# Patient Record
Sex: MALE | Race: WHITE | Employment: FULL TIME | URBAN - METROPOLITAN AREA
[De-identification: names, ages, dates, MRNs, and addresses within clinical notes are randomized per-mention and may not be internally consistent; named-entity substitution may affect disease eponyms.]

---

## 2022-11-14 ENCOUNTER — HOSPITAL ENCOUNTER (INPATIENT)
Age: 42
LOS: 7 days | Discharge: HOME | DRG: 885 | End: 2022-11-21
Attending: PSYCHIATRY & NEUROLOGY | Admitting: PSYCHIATRY & NEUROLOGY
Payer: MEDICARE

## 2022-11-14 ENCOUNTER — HOSPITAL ENCOUNTER (EMERGENCY)
Age: 42
Discharge: ANOTHER HOSPITAL | End: 2022-11-14
Attending: EMERGENCY MEDICINE
Payer: MEDICARE

## 2022-11-14 VITALS
OXYGEN SATURATION: 100 % | HEART RATE: 75 BPM | TEMPERATURE: 97.5 F | SYSTOLIC BLOOD PRESSURE: 136 MMHG | DIASTOLIC BLOOD PRESSURE: 87 MMHG | HEIGHT: 71 IN | BODY MASS INDEX: 23.27 KG/M2 | RESPIRATION RATE: 18 BRPM | WEIGHT: 166.23 LBS

## 2022-11-14 DIAGNOSIS — F31.4 BIPOLAR DISORDER, CURRENT EPISODE DEPRESSED, SEVERE, WITHOUT PSYCHOTIC FEATURES (HCC): Primary | ICD-10-CM

## 2022-11-14 LAB
ALBUMIN SERPL-MCNC: 3.7 G/DL (ref 3.5–5)
ALBUMIN/GLOB SERPL: 1.2 {RATIO} (ref 1.1–2.2)
ALP SERPL-CCNC: 84 U/L (ref 45–117)
ALT SERPL-CCNC: 16 U/L (ref 12–78)
AMPHET UR QL SCN: NEGATIVE
ANION GAP SERPL CALC-SCNC: 6 MMOL/L (ref 5–15)
APAP SERPL-MCNC: <2 UG/ML (ref 10–30)
APPEARANCE UR: CLEAR
AST SERPL-CCNC: 5 U/L (ref 15–37)
BACTERIA URNS QL MICRO: NEGATIVE /HPF
BARBITURATES UR QL SCN: NEGATIVE
BASOPHILS # BLD: 0.1 K/UL (ref 0–0.1)
BASOPHILS NFR BLD: 1 % (ref 0–1)
BENZODIAZ UR QL: NEGATIVE
BILIRUB SERPL-MCNC: 0.4 MG/DL (ref 0.2–1)
BILIRUB UR QL: NEGATIVE
BUN SERPL-MCNC: 12 MG/DL (ref 6–20)
BUN/CREAT SERPL: 10 (ref 12–20)
CALCIUM SERPL-MCNC: 8.9 MG/DL (ref 8.5–10.1)
CANNABINOIDS UR QL SCN: NEGATIVE
CHLORIDE SERPL-SCNC: 105 MMOL/L (ref 97–108)
CO2 SERPL-SCNC: 28 MMOL/L (ref 21–32)
COCAINE UR QL SCN: NEGATIVE
COLOR UR: NORMAL
COVID-19 RAPID TEST, COVR: NOT DETECTED
CREAT SERPL-MCNC: 1.19 MG/DL (ref 0.7–1.3)
DATE LAST DOSE: ABNORMAL
DIFFERENTIAL METHOD BLD: NORMAL
DRUG SCRN COMMENT,DRGCM: NORMAL
EOSINOPHIL # BLD: 0.1 K/UL (ref 0–0.4)
EOSINOPHIL NFR BLD: 1 % (ref 0–7)
EPITH CASTS URNS QL MICRO: NORMAL /LPF
ERYTHROCYTE [DISTWIDTH] IN BLOOD BY AUTOMATED COUNT: 12.2 % (ref 11.5–14.5)
ETHANOL SERPL-MCNC: <10 MG/DL
FLUAV RNA SPEC QL NAA+PROBE: NOT DETECTED
FLUBV RNA SPEC QL NAA+PROBE: NOT DETECTED
GLOBULIN SER CALC-MCNC: 3.1 G/DL (ref 2–4)
GLUCOSE SERPL-MCNC: 172 MG/DL (ref 65–100)
GLUCOSE UR STRIP.AUTO-MCNC: NEGATIVE MG/DL
HCT VFR BLD AUTO: 43.1 % (ref 36.6–50.3)
HGB BLD-MCNC: 14.4 G/DL (ref 12.1–17)
HGB UR QL STRIP: NEGATIVE
HYALINE CASTS URNS QL MICRO: NORMAL /LPF (ref 0–2)
IMM GRANULOCYTES # BLD AUTO: 0 K/UL (ref 0–0.04)
IMM GRANULOCYTES NFR BLD AUTO: 0 % (ref 0–0.5)
KETONES UR QL STRIP.AUTO: NEGATIVE MG/DL
LEUKOCYTE ESTERASE UR QL STRIP.AUTO: NEGATIVE
LITHIUM SERPL-SCNC: <0.2 MMOL/L (ref 0.6–1.2)
LYMPHOCYTES # BLD: 1.5 K/UL (ref 0.8–3.5)
LYMPHOCYTES NFR BLD: 17 % (ref 12–49)
MCH RBC QN AUTO: 31.6 PG (ref 26–34)
MCHC RBC AUTO-ENTMCNC: 33.4 G/DL (ref 30–36.5)
MCV RBC AUTO: 94.5 FL (ref 80–99)
METHADONE UR QL: NEGATIVE
MONOCYTES # BLD: 0.5 K/UL (ref 0–1)
MONOCYTES NFR BLD: 6 % (ref 5–13)
NEUTS SEG # BLD: 6.6 K/UL (ref 1.8–8)
NEUTS SEG NFR BLD: 75 % (ref 32–75)
NITRITE UR QL STRIP.AUTO: NEGATIVE
NRBC # BLD: 0 K/UL (ref 0–0.01)
NRBC BLD-RTO: 0 PER 100 WBC
OPIATES UR QL: NEGATIVE
PCP UR QL: NEGATIVE
PH UR STRIP: 6.5 [PH] (ref 5–8)
PLATELET # BLD AUTO: 233 K/UL (ref 150–400)
PMV BLD AUTO: 10.5 FL (ref 8.9–12.9)
POTASSIUM SERPL-SCNC: 3.6 MMOL/L (ref 3.5–5.1)
PROT SERPL-MCNC: 6.8 G/DL (ref 6.4–8.2)
PROT UR STRIP-MCNC: NEGATIVE MG/DL
RBC # BLD AUTO: 4.56 M/UL (ref 4.1–5.7)
RBC #/AREA URNS HPF: NORMAL /HPF (ref 0–5)
REPORTED DOSE,DOSE: ABNORMAL UNITS
REPORTED DOSE/TIME,TMG: ABNORMAL
SALICYLATES SERPL-MCNC: <1.7 MG/DL (ref 2.8–20)
SARS-COV-2, COV2: NOT DETECTED
SODIUM SERPL-SCNC: 139 MMOL/L (ref 136–145)
SOURCE, COVRS: NORMAL
SP GR UR REFRACTOMETRY: <1.005
UA: UC IF INDICATED,UAUC: NORMAL
UROBILINOGEN UR QL STRIP.AUTO: 0.2 EU/DL (ref 0.2–1)
WBC # BLD AUTO: 8.7 K/UL (ref 4.1–11.1)
WBC URNS QL MICRO: NORMAL /HPF (ref 0–4)

## 2022-11-14 PROCEDURE — 80179 DRUG ASSAY SALICYLATE: CPT

## 2022-11-14 PROCEDURE — 65220000003 HC RM SEMIPRIVATE PSYCH

## 2022-11-14 PROCEDURE — 80053 COMPREHEN METABOLIC PANEL: CPT

## 2022-11-14 PROCEDURE — 87635 SARS-COV-2 COVID-19 AMP PRB: CPT

## 2022-11-14 PROCEDURE — 80143 DRUG ASSAY ACETAMINOPHEN: CPT

## 2022-11-14 PROCEDURE — 74011250637 HC RX REV CODE- 250/637: Performed by: EMERGENCY MEDICINE

## 2022-11-14 PROCEDURE — 99285 EMERGENCY DEPT VISIT HI MDM: CPT

## 2022-11-14 PROCEDURE — 81001 URINALYSIS AUTO W/SCOPE: CPT

## 2022-11-14 PROCEDURE — 87636 SARSCOV2 & INF A&B AMP PRB: CPT

## 2022-11-14 PROCEDURE — 85025 COMPLETE CBC W/AUTO DIFF WBC: CPT

## 2022-11-14 PROCEDURE — 36415 COLL VENOUS BLD VENIPUNCTURE: CPT

## 2022-11-14 PROCEDURE — 80178 ASSAY OF LITHIUM: CPT

## 2022-11-14 PROCEDURE — 80307 DRUG TEST PRSMV CHEM ANLYZR: CPT

## 2022-11-14 PROCEDURE — 82077 ASSAY SPEC XCP UR&BREATH IA: CPT

## 2022-11-14 RX ORDER — ARIPIPRAZOLE 5 MG/1
20 TABLET ORAL DAILY
Status: DISCONTINUED | OUTPATIENT
Start: 2022-11-15 | End: 2022-11-14

## 2022-11-14 RX ORDER — LITHIUM CARBONATE 300 MG
300 TABLET ORAL 2 TIMES DAILY
Status: DISCONTINUED | OUTPATIENT
Start: 2022-11-14 | End: 2022-11-14 | Stop reason: HOSPADM

## 2022-11-14 RX ORDER — IBUPROFEN 200 MG
1 TABLET ORAL DAILY
Status: DISCONTINUED | OUTPATIENT
Start: 2022-11-14 | End: 2022-11-14 | Stop reason: HOSPADM

## 2022-11-14 RX ORDER — CLONAZEPAM 0.5 MG/1
0.5 TABLET ORAL
Status: COMPLETED | OUTPATIENT
Start: 2022-11-14 | End: 2022-11-14

## 2022-11-14 RX ORDER — IBUPROFEN 200 MG
1 TABLET ORAL DAILY
Status: DISCONTINUED | OUTPATIENT
Start: 2022-11-15 | End: 2022-11-14

## 2022-11-14 RX ORDER — ARIPIPRAZOLE 5 MG/1
20 TABLET ORAL DAILY
Status: DISCONTINUED | OUTPATIENT
Start: 2022-11-14 | End: 2022-11-14 | Stop reason: HOSPADM

## 2022-11-14 RX ADMIN — ARIPIPRAZOLE 20 MG: 5 TABLET ORAL at 19:14

## 2022-11-14 RX ADMIN — CLONAZEPAM 0.5 MG: 0.5 TABLET ORAL at 16:50

## 2022-11-14 RX ADMIN — LITHIUM CARBONATE 300 MG: 300 TABLET ORAL at 19:14

## 2022-11-14 NOTE — PROGRESS NOTES
BSMART assessment completed, and suicide risk level noted to be moderate. Primary Nurse/John and ED Provider/Dr Alvaro Santos notified. Concerns not observed. Security/Off- has not been notified.

## 2022-11-14 NOTE — BSMART NOTE
Lindat spoke to ED provider regarding patient. There are two previous consults to be completed before patient will be assessed. Provider is agreeable with plan of care.
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
## 2022-11-15 PROBLEM — F31.30 BIPOLAR DISORDER CURRENT EPISODE DEPRESSED (HCC): Status: ACTIVE | Noted: 2022-11-15

## 2022-11-15 PROCEDURE — 74011250637 HC RX REV CODE- 250/637

## 2022-11-15 PROCEDURE — 65220000003 HC RM SEMIPRIVATE PSYCH

## 2022-11-15 RX ORDER — ARIPIPRAZOLE 10 MG/1
10 TABLET ORAL DAILY
Status: DISCONTINUED | OUTPATIENT
Start: 2022-11-15 | End: 2022-11-16

## 2022-11-15 RX ORDER — ACETAMINOPHEN 325 MG/1
650 TABLET ORAL
Status: DISCONTINUED | OUTPATIENT
Start: 2022-11-15 | End: 2022-11-21 | Stop reason: HOSPADM

## 2022-11-15 RX ORDER — IBUPROFEN 200 MG
1 TABLET ORAL DAILY
Status: DISCONTINUED | OUTPATIENT
Start: 2022-11-15 | End: 2022-11-21 | Stop reason: HOSPADM

## 2022-11-15 RX ORDER — MIDAZOLAM HYDROCHLORIDE 1 MG/ML
2 INJECTION, SOLUTION INTRAMUSCULAR; INTRAVENOUS
Status: DISCONTINUED | OUTPATIENT
Start: 2022-11-15 | End: 2022-11-21 | Stop reason: HOSPADM

## 2022-11-15 RX ORDER — HALOPERIDOL 5 MG/ML
5 INJECTION INTRAMUSCULAR
Status: DISCONTINUED | OUTPATIENT
Start: 2022-11-15 | End: 2022-11-21 | Stop reason: HOSPADM

## 2022-11-15 RX ORDER — DIPHENHYDRAMINE HYDROCHLORIDE 50 MG/ML
50 INJECTION, SOLUTION INTRAMUSCULAR; INTRAVENOUS
Status: DISCONTINUED | OUTPATIENT
Start: 2022-11-15 | End: 2022-11-21 | Stop reason: HOSPADM

## 2022-11-15 RX ORDER — ARIPIPRAZOLE 20 MG/1
20 TABLET ORAL DAILY
Status: ON HOLD | COMMUNITY
End: 2022-11-21 | Stop reason: SDUPTHER

## 2022-11-15 RX ORDER — BENZTROPINE MESYLATE 1 MG/1
1 TABLET ORAL
Status: DISCONTINUED | OUTPATIENT
Start: 2022-11-15 | End: 2022-11-21 | Stop reason: HOSPADM

## 2022-11-15 RX ORDER — ADHESIVE BANDAGE
30 BANDAGE TOPICAL DAILY PRN
Status: DISCONTINUED | OUTPATIENT
Start: 2022-11-15 | End: 2022-11-21 | Stop reason: HOSPADM

## 2022-11-15 RX ORDER — TRAZODONE HYDROCHLORIDE 50 MG/1
50 TABLET ORAL
Status: DISCONTINUED | OUTPATIENT
Start: 2022-11-15 | End: 2022-11-21 | Stop reason: HOSPADM

## 2022-11-15 RX ORDER — LITHIUM CARBONATE 300 MG
300 TABLET ORAL 2 TIMES DAILY
Status: DISCONTINUED | OUTPATIENT
Start: 2022-11-15 | End: 2022-11-21 | Stop reason: HOSPADM

## 2022-11-15 RX ORDER — OLANZAPINE 5 MG/1
5 TABLET ORAL
Status: DISCONTINUED | OUTPATIENT
Start: 2022-11-15 | End: 2022-11-21 | Stop reason: HOSPADM

## 2022-11-15 RX ORDER — HYDROXYZINE 50 MG/1
50 TABLET, FILM COATED ORAL
Status: DISCONTINUED | OUTPATIENT
Start: 2022-11-15 | End: 2022-11-21 | Stop reason: HOSPADM

## 2022-11-15 RX ADMIN — ACETAMINOPHEN 650 MG: 325 TABLET ORAL at 17:09

## 2022-11-15 RX ADMIN — LITHIUM CARBONATE 300 MG: 300 TABLET ORAL at 22:19

## 2022-11-15 RX ADMIN — LITHIUM CARBONATE 300 MG: 300 TABLET ORAL at 11:04

## 2022-11-15 RX ADMIN — ARIPIPRAZOLE 10 MG: 10 TABLET ORAL at 11:04

## 2022-11-15 RX ADMIN — ACETAMINOPHEN 650 MG: 325 TABLET ORAL at 08:48
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
## 2022-11-15 NOTE — BH NOTES
GROUP THERAPY PROGRESS NOTE    Patient did not participate in recreational therapy group.     BENY Bianchi, QMHP-A
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
## 2022-11-15 NOTE — PROGRESS NOTES
Laboratory Monitoring for Antipsychotics: This patient is currently prescribed the following medication(s):   Current Facility-Administered Medications   Medication Dose Route Frequency    [START ON 11/17/2022] ARIPiprazole (ABILIFY) tablet 20 mg  20 mg Oral DAILY    lithium carbonate tablet 300 mg  300 mg Oral BID    nicotine (NICODERM CQ) 21 mg/24 hr patch 1 Patch  1 Patch TransDERmal DAILY     The following labs have been completed for monitoring of antipsychotics and/or mood stabilizers:    Height, Weight, BMI Estimation  Estimated body mass index is 23.06 kg/m² as calculated from the following:    Height as of this encounter: 180.3 cm (71\"). Weight as of this encounter: 75 kg (165 lb 5.5 oz). Vital Signs/Blood Pressure  Visit Vitals  /75 (BP Patient Position: Sitting)   Pulse (!) 105   Temp 97.6 °F (36.4 °C)   Resp 16   Ht 180.3 cm (71\")   Wt 75 kg (165 lb 5.5 oz)   SpO2 99%   BMI 23.06 kg/m²     Renal Function, Hepatic Function and Chemistry  Estimated Creatinine Clearance: 84 mL/min (based on SCr of 1.19 mg/dL). Lab Results   Component Value Date/Time    Sodium 139 11/14/2022 03:42 PM    Potassium 3.6 11/14/2022 03:42 PM    Chloride 105 11/14/2022 03:42 PM    CO2 28 11/14/2022 03:42 PM    Anion gap 6 11/14/2022 03:42 PM    BUN 12 11/14/2022 03:42 PM    Creatinine 1.19 11/14/2022 03:42 PM    BUN/Creatinine ratio 10 (L) 11/14/2022 03:42 PM    Bilirubin, total 0.4 11/14/2022 03:42 PM    Protein, total 6.8 11/14/2022 03:42 PM    Albumin 3.7 11/14/2022 03:42 PM    Globulin 3.1 11/14/2022 03:42 PM    A-G Ratio 1.2 11/14/2022 03:42 PM    ALT (SGPT) 16 11/14/2022 03:42 PM    AST (SGOT) 5 (L) 11/14/2022 03:42 PM    Alk.  phosphatase 84 11/14/2022 03:42 PM     Lab Results   Component Value Date/Time    Glucose 172 (H) 11/14/2022 03:42 PM     No results found for: HBA1C, NYL8ONIR    Hematology  Lab Results   Component Value Date/Time    WBC 8.7 11/14/2022 03:42 PM    RBC 4.56 11/14/2022 03:42 PM    HGB 14.4 11/14/2022 03:42 PM    HCT 43.1 11/14/2022 03:42 PM    MCV 94.5 11/14/2022 03:42 PM    MCH 31.6 11/14/2022 03:42 PM    MCHC 33.4 11/14/2022 03:42 PM    RDW 12.2 11/14/2022 03:42 PM    PLATELET 919 63/42/8751 03:42 PM     Lipids  Lab Results   Component Value Date/Time    Cholesterol, total 144 11/16/2022 06:00 AM    HDL Cholesterol 47 11/16/2022 06:00 AM    LDL, calculated 78.6 11/16/2022 06:00 AM    Triglyceride 92 11/16/2022 06:00 AM    CHOL/HDL Ratio 3.1 11/16/2022 06:00 AM     Thyroid Function  Lab Results   Component Value Date/Time    TSH 0.39 11/16/2022 06:00 AM    T4, Total 9.8 11/16/2022 06:00 AM     Assessment/Plan:  Recommended baseline laboratory monitoring has been completed based on this patient's current medication regimen. The hemoglobin A1c has been drawn but has not yet resulted. Follow-up metabolic monitoring labs should be completed in 3 months (quarterly for the first year of antipsychotic therapy).      ZHANG FranklinD

## 2022-11-15 NOTE — INTERDISCIPLINARY ROUNDS
Behavioral Health Interdisciplinary Rounds     Patient Name: Marisol Ott  Age: 38 y.o. Room/Bed:  746/02  Primary Diagnosis: <principal problem not specified>   Admission Status: Voluntary     Readmission within 30 days: no  Power of  in place: no  Patient requires a blocked bed: no          Reason for blocked bed:       Flu Vaccine : no   Consults:          Labs/Testing due today? Have they refused labs?: no    Sleep hours:  7      Participation in Care/Groups:  n/a new admit  Medication Compliant?:  No meds scheduled at this time  PRNS (last 24 hours): None    Restraints (last 24 hours):  no     CIWA (range last 24 hours):     COWS (range last 24 hours):      Alcohol screening (AUDIT) completed -         If applicable, date SBIRT discussed in treatment team AND documented:   AUDIT Screen Score:        Document Brief Intervention (corresponds directly with the 5 A's, Ask, Advise, Assess, Assist, and Arrange): At- Risk Patients (Score 7-15 for women; 8-15 for men)  Discuss concern patient is drinking at unhealthy levels known to increase risk of alcohol-related health problems. Is Patient ready to commit to change? If No:  Encourage reflection  Discuss short term and long term health risks of consuming alcohol  Barriers to change  Reaffirm willingness to help / Educational materials provided  If Yes:  Set goal  Plan  Educational materials provided    Harmful use or Dependence (Score 16 or greater)  Discuss short term and long term health risks of consuming alcohol  Recommendations  Negotiate drinking goal  Recommend addiction specialist/center  Arrange follow-up appointments.     Tobacco - patient is a smoker: Have You Used Tobacco in the Past 30 Days: Yes  Illegal Drugs use: Have You Used Any Illegal Substances Over the Past 12 Months: No    24 hour chart check complete: yes ____________________________________________________________________________________________________________    Patient goal(s) for today:   Treatment team focus/goals:   Progress note     LOS:  1  Expected LOS:     Financial concerns/prescription coverage:    Family contact:       Family requesting physician contact today:    Discharge plan:   Access to weapons :         Outpatient provider(s):   Patient's preferred phone number for follow up call :   Patient's preferred e-mail address :  Participating treatment team members: Amy Alicea, * (assigned SW),

## 2022-11-15 NOTE — PROGRESS NOTES
Problem: Falls - Risk of  Goal: *Absence of Falls  Description: Document Shade Bivins Fall Risk and appropriate interventions in the flowsheet.   Outcome: Progressing Towards Goal  Note: Fall Risk Interventions:

## 2022-11-15 NOTE — BH NOTES
GROUP THERAPY PROGRESS NOTE    Patient did not participate in self-care group.     Jaxson SWAN, [facility]-A

## 2022-11-15 NOTE — BH NOTES
GROUP THERAPY PROGRESS NOTE    Patient did not participate in psychotherapy group.     Chucho Knox MSW, [facility]-A

## 2022-11-15 NOTE — ED NOTES
Patient sleeping comfortably while he waits for transport  EMTALA ready  Chart copied and ready  ADP and transfer forms ready
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
## 2022-11-15 NOTE — BH NOTES
Primary Nurse Mahala Kanner and Aldo Brunner, RN performed a dual skin assessment on this patient No impairment noted  Blanco score is 23
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
## 2022-11-15 NOTE — BH NOTES
TRANSFER - IN REPORT:    Verbal report received from LESLEY Gomez(name) on Tomas Juárez  being received from [facility] ED(unit) for routine progression of care      Report consisted of patients Situation, Background, Assessment and   Recommendations(SBAR). Information from the following report(s) SBAR, MAR, and Recent Results was reviewed with the receiving nurse. Opportunity for questions and clarification was provided. Assessment completed upon patients arrival to unit and care assumed.

## 2022-11-15 NOTE — BSMART NOTE
Patient has been accepted by [facility] on behalf of Dr Carl Ordoñez to [facility] 746 Bed 2. Report can be called at [phone].
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
## 2022-11-15 NOTE — INTERDISCIPLINARY ROUNDS
Behavioral Health Interdisciplinary Rounds     Patient Name: Patricio Raygoza  Age: 38 y.o. Room/Bed:  746/02  Primary Diagnosis: <principal problem not specified>   Admission Status: Voluntary     Readmission within 30 days: no  Power of  in place: no  Patient requires a blocked bed: no          Reason for blocked bed:       Flu Vaccine : no   Consults:          Labs/Testing due today? Have they refused labs?: no    Sleep hours:  7      Participation in Care/Groups:  n/a new admit  Medication Compliant?:  No meds scheduled at this time  PRNS (last 24 hours): None    Restraints (last 24 hours):  no     CIWA (range last 24 hours):     COWS (range last 24 hours):      Alcohol screening (AUDIT) completed -         If applicable, date SBIRT discussed in treatment team AND documented:   AUDIT Screen Score:        Document Brief Intervention (corresponds directly with the 5 A's, Ask, Advise, Assess, Assist, and Arrange): At- Risk Patients (Score 7-15 for women; 8-15 for men)  Discuss concern patient is drinking at unhealthy levels known to increase risk of alcohol-related health problems. Is Patient ready to commit to change? If No:  Encourage reflection  Discuss short term and long term health risks of consuming alcohol  Barriers to change  Reaffirm willingness to help / Educational materials provided  If Yes:  Set goal  Plan  Educational materials provided    Harmful use or Dependence (Score 16 or greater)  Discuss short term and long term health risks of consuming alcohol  Recommendations  Negotiate drinking goal  Recommend addiction specialist/center  Arrange follow-up appointments.     Tobacco - patient is a smoker: Have You Used Tobacco in the Past 30 Days: Yes  Illegal Drugs use: Have You Used Any Illegal Substances Over the Past 12 Months: No    24 hour chart check complete: yes ____________________________________________________________________________________________________________    Patient goal(s) for today: rest, communicate needs to staff, complete ADLs  Treatment team focus/goals: discuss reason for admission  Progress note: Pt met with treatment team for the first time and discussed reason for admission. Recent stressors include separation from his wife and unstable housing. Presenting problems include lack of hygiene, weight loss, depressive symptoms, and anxiety. NP discussed psychiatric care hx and beginning medications on the unit. The SW asked about social supports to begin community assessment and discharge planning. LOS:  1  Expected LOS: 3-5    Financial concerns/prescription coverage: [place] Medicare Part A & B  Family contact: Shira Macias, [phone]  Family requesting physician contact today: no  Discharge plan:   Access to weapons:   Outpatient provider(s): [facility]  Patient's preferred phone number for follow up call: [phone]   Patient's preferred e-mail address:   Participating treatment team members: Kelsie Walden; Graciela Lopez NP; Molina Askew RN; BENY Mitchell; ANGELO Watts

## 2022-11-15 NOTE — PROGRESS NOTES
Problem: Depressed Mood (Adult/Pediatric)  Goal: *STG: Participates in treatment plan  Outcome: Progressing Towards Goal  Note: Out on unit passively engaged, mood depressed/anxious and affect flat. Declined to attend group this am. Reports lack of medications and ability to afford Donald Locus as primary reason for his admission and decompensation. Denies SI at this time. Thoughts organized, logical and goal directed. Open to restarting medications  and adjustments. Staff focus is on offering support and reassurance.    Goal: *STG: Verbalizes anger, guilt, and other feelings in a constructive manor  Outcome: Progressing Towards Goal  Goal: *STG: Demonstrates reduction in symptoms and increase in insight into coping skills/future focused  Outcome: Progressing Towards Goal  Goal: Interventions  Outcome: Progressing Towards Goal

## 2022-11-16 LAB
CHOLEST SERPL-MCNC: 144 MG/DL
COMMENT, HOLDF: NORMAL
HDLC SERPL-MCNC: 47 MG/DL
HDLC SERPL: 3.1 {RATIO} (ref 0–5)
LDLC SERPL CALC-MCNC: 78.6 MG/DL (ref 0–100)
SAMPLES BEING HELD,HOLD: NORMAL
T3FREE SERPL-MCNC: 2.3 PG/ML (ref 2.2–4)
T4 SERPL-MCNC: 9.8 UG/DL (ref 4.5–12.1)
TRIGL SERPL-MCNC: 92 MG/DL (ref ?–150)
TSH SERPL DL<=0.05 MIU/L-ACNC: 0.39 UIU/ML (ref 0.36–3.74)
VLDLC SERPL CALC-MCNC: 18.4 MG/DL

## 2022-11-16 PROCEDURE — 74011250637 HC RX REV CODE- 250/637

## 2022-11-16 PROCEDURE — 84481 FREE ASSAY (FT-3): CPT

## 2022-11-16 PROCEDURE — 84443 ASSAY THYROID STIM HORMONE: CPT

## 2022-11-16 PROCEDURE — 65220000003 HC RM SEMIPRIVATE PSYCH

## 2022-11-16 PROCEDURE — 80061 LIPID PANEL: CPT

## 2022-11-16 PROCEDURE — 36415 COLL VENOUS BLD VENIPUNCTURE: CPT

## 2022-11-16 PROCEDURE — 84436 ASSAY OF TOTAL THYROXINE: CPT

## 2022-11-16 RX ORDER — ARIPIPRAZOLE 10 MG/1
20 TABLET ORAL DAILY
Status: DISCONTINUED | OUTPATIENT
Start: 2022-11-17 | End: 2022-11-21 | Stop reason: HOSPADM

## 2022-11-16 RX ADMIN — LITHIUM CARBONATE 300 MG: 300 TABLET ORAL at 09:07

## 2022-11-16 RX ADMIN — ACETAMINOPHEN 650 MG: 325 TABLET ORAL at 09:07

## 2022-11-16 RX ADMIN — LITHIUM CARBONATE 300 MG: 300 TABLET ORAL at 22:01

## 2022-11-16 RX ADMIN — ACETAMINOPHEN 650 MG: 325 TABLET ORAL at 16:54

## 2022-11-16 RX ADMIN — ARIPIPRAZOLE 10 MG: 10 TABLET ORAL at 09:07

## 2022-11-16 NOTE — INTERDISCIPLINARY ROUNDS
Behavioral Health Interdisciplinary Rounds     Patient Name: Yovany Wan  Age: 38 y.o. Room/Bed:  729/  Primary Diagnosis: <principal problem not specified>   Admission Status: Voluntary     Readmission within 30 days: no  Power of  in place: no  Patient requires a blocked bed: no          Reason for blocked bed:       Flu Vaccine :    Consults:          Labs/Testing due today? Have they refused labs?: yes    Sleep hours: 7       Participation in Care/Groups:    Medication Compliant?: Yes  PRNS (last 24 hours): None    Restraints (last 24 hours):  no     CIWA (range last 24 hours):     COWS (range last 24 hours):      Alcohol screening (AUDIT) completed -         If applicable, date SBIRT discussed in treatment team AND documented:   AUDIT Screen Score:        Document Brief Intervention (corresponds directly with the 5 A's, Ask, Advise, Assess, Assist, and Arrange): At- Risk Patients (Score 7-15 for women; 8-15 for men)  Discuss concern patient is drinking at unhealthy levels known to increase risk of alcohol-related health problems. Is Patient ready to commit to change? If No:  Encourage reflection  Discuss short term and long term health risks of consuming alcohol  Barriers to change  Reaffirm willingness to help / Educational materials provided  If Yes:  Set goal  Plan  Educational materials provided    Harmful use or Dependence (Score 16 or greater)  Discuss short term and long term health risks of consuming alcohol  Recommendations  Negotiate drinking goal  Recommend addiction specialist/center  Arrange follow-up appointments.     Tobacco - patient is a smoker: Have You Used Tobacco in the Past 30 Days: Yes  Illegal Drugs use: Have You Used Any Illegal Substances Over the Past 12 Months: No    24 hour chart check complete: yes   ____________________________________________________________________________________________________________    Patient goal(s) for today: rest, communicate needs to staff, take medication as prescribed, complete ADLs  Treatment team focus/goals: discuss treatment plan and medication regiment   Progress note: Treatment team discussed medication regiment with pt and discussed any concerns pt had. PT expressed no other concerns. SW to follow up, NP to continue to monitor medications.                                    LOS:  2                  Expected LOS: 3-5     Financial concerns/prescription coverage: [place] Medicare Part A & B  Family contact: Laura Ac, [phone]  Family requesting physician contact today: no  Discharge plan:   Access to weapons:   Outpatient provider(s): [facility]  Patient's preferred phone number for follow up call: [phone]   Patient's preferred e-mail address:   Participating treatment team members: Tomas Juárez; Norris Fitch NP; Ruby Alexandra RN; BENY Gaviria; ALEYDA Valdivia.S.

## 2022-11-16 NOTE — BH NOTES
GROUP THERAPY PROGRESS NOTE    Patient did not participate in psychotherapy group.     Annetta SWAN, [facility]

## 2022-11-16 NOTE — PROGRESS NOTES
Spiritual Care Assessment/Progress Note  [facility]      NAME: Irvin Gomez      MRN: [id number]  AGE: 38 y.o.  SEX: male  Presybeterian Affiliation: No Hoahaoism   Language: English     11/16/2022     Total Time (in minutes): 35     Spiritual Assessment begun in [address] through conversation with:         [x]Patient        [] Family    [] Friend(s)        Reason for Consult: Initial/Spiritual assessment, patient floor     Spiritual beliefs: (Please include comment if needed)     [x] Identifies with a ubaldo tradition: Advent/Jehovah's witness        [] Supported by a ubaldo community:            [] Claims no spiritual orientation:           [] Seeking spiritual identity:                [] Adheres to an individual form of spirituality:           [] Not able to assess:                           Identified resources for coping:      [] Prayer                               [] Music                  [] Guided Imagery     [] Family/friends                 [] Pet visits     [] Devotional reading                         [x] Unknown     [] Other:                                               Interventions offered during this visit: (See comments for more details)    Patient Interventions: Initial visit, Coping skills reviewed/reinforced, Affirmation of ubaldo, Affirmation of emotions/emotional suffering, Catharsis/review of pertinent events in supportive environment           Plan of Care:     [] Support spiritual and/or cultural needs    [] Support AMD and/or advance care planning process      [] Support grieving process   [] Coordinate Rites and/or Rituals    [] Coordination with community clergy   [] No spiritual needs identified at this time   [] Detailed Plan of Care below (See Comments)  [] Make referral to Music Therapy  [] Make referral to Pet Therapy     [] Make referral to Addiction services  [] Make referral to [facility]  [] Make referral to Spiritual Care Partner  [] No future visits requested [] Contact Spiritual Care for further referrals     Comments:  Eileen Wallis at [address] in [address]. I reviewed the medical record prior to this encounter. Spiritual Assessment: Experiencing difficult emotions and relational stress    We discussed Marco Antonio's current feelings/concerns and spiritual perspectives. The patient reported that he's  from his wife. He shared about the events that lead to this admission and marital issues. He reports an addiction to stock trading and gambling. He stated that he was receiving spiritual care at the [facility] and is hoping to return to there for \"rehab\". I cultivated a relationship of support through listening; explored spiritual beliefs; emotional concerns; and coping strategies. I provided spiritual guidance in accordance with patient's expressed beliefs. I invited Andie Solitario to focus on his inner healing instead of marriage restoration. Outcome: Eileen Wallis expressed appreciation for pastoral support and the opportunity to share his thoughts and feelings. Plan of Care: Patient is aware of  availability and was encouraged to request support as needed. The  on-call can be reached at [phone]). Rev.  Arielle Lugo MDiv, MS, [facility]  Staff

## 2022-11-16 NOTE — PROGRESS NOTES
Problem: Depressed Mood (Adult/Pediatric)  Goal: *STG: Participates in treatment plan  Outcome: Progressing Towards Goal  Goal: *STG: Verbalizes anger, guilt, and other feelings in a constructive manor  Outcome: Progressing Towards Goal  Goal: *STG: Attends activities and groups  Outcome: Progressing Towards Goal     Pt alert and oriented. Pt is seen out on unit appropriate with peers and staff. Pt is meal and medication compliant.

## 2022-11-16 NOTE — PROGRESS NOTES
Chief Complaint: \"I feel pretty well. \"    Length of Stay: 2 Days    Interval History: Shameka Ji reports he is a little bored and there is not much to do on the unit. He has not been attending groups thus far. Discussed importance of attending group while here. He reports he slept pretty well overall last night other than being woken up by staff for various things. He states he is eating well. He is medication compliant with no side effects that he has noticed other than a little hand tremor, but he states he always has some of this. He states his mood is better than is was yesterday. He admits it is not where it should be currently. He denies SI/HI/AVH currently. Past Medical History:  Past Medical History:   Diagnosis Date    Psychiatric disorder     phobias           Labs:  Lab Results   Component Value Date/Time    WBC 8.7 11/14/2022 03:42 PM    HGB 14.4 11/14/2022 03:42 PM    HCT 43.1 11/14/2022 03:42 PM    PLATELET 130 23/66/9166 03:42 PM    MCV 94.5 11/14/2022 03:42 PM      Lab Results   Component Value Date/Time    Sodium 139 11/14/2022 03:42 PM    Potassium 3.6 11/14/2022 03:42 PM    Chloride 105 11/14/2022 03:42 PM    CO2 28 11/14/2022 03:42 PM    Anion gap 6 11/14/2022 03:42 PM    Glucose 172 (H) 11/14/2022 03:42 PM    BUN 12 11/14/2022 03:42 PM    Creatinine 1.19 11/14/2022 03:42 PM    BUN/Creatinine ratio 10 (L) 11/14/2022 03:42 PM    Calcium 8.9 11/14/2022 03:42 PM    Bilirubin, total 0.4 11/14/2022 03:42 PM    Alk.  phosphatase 84 11/14/2022 03:42 PM    Protein, total 6.8 11/14/2022 03:42 PM    Albumin 3.7 11/14/2022 03:42 PM    Globulin 3.1 11/14/2022 03:42 PM    A-G Ratio 1.2 11/14/2022 03:42 PM    ALT (SGPT) 16 11/14/2022 03:42 PM      Vitals:    11/15/22 1100 11/15/22 1607 11/15/22 2025 11/16/22 0927   BP: 113/71 113/72 110/75 110/75   Pulse: 70 (!) 110 85 (!) 105   Resp: 16 16 16 16   Temp: 98.2 °F (36.8 °C) 98 °F (36.7 °C) 97.8 °F (36.6 °C) 97.6 °F (36.4 °C)   SpO2:  98% 99% 99%   Weight:       Height:             Current Facility-Administered Medications   Medication Dose Route Frequency Provider Last Rate Last Admin    OLANZapine (ZyPREXA) tablet 5 mg  5 mg Oral Q6H PRN Vanessa Booker, BRISEYDA        haloperidol lactate (HALDOL) injection 5 mg  5 mg IntraMUSCular Q6H PRN Vanessa Booker, BRISEYDA        benztropine (COGENTIN) tablet 1 mg  1 mg Oral BID PRN Vanessa De La Pazson, NP        diphenhydrAMINE (BENADRYL) injection 50 mg  50 mg IntraMUSCular BID PRN Vanessa Booker, NP        hydrOXYzine HCL (ATARAX) tablet 50 mg  50 mg Oral TID PRN Vanessa Jhony, NP        traZODone (DESYREL) tablet 50 mg  50 mg Oral QHS PRN Vanessarosa Booker, NP        acetaminophen (TYLENOL) tablet 650 mg  650 mg Oral Q4H PRN Vanessa Boligee, NP   650 mg at 11/16/22 8867    magnesium hydroxide (MILK OF MAGNESIA) 400 mg/5 mL oral suspension 30 mL  30 mL Oral DAILY PRN Vanessa Booker, NP        midazolam (VERSED) injection 2 mg  2 mg IntraMUSCular Q6H PRN Vanessa Booker, BRISEYDA        lithium carbonate tablet 300 mg  300 mg Oral BID Fritz Lanes A, NP   300 mg at 11/16/22 6130    nicotine (NICODERM CQ) 21 mg/24 hr patch 1 Patch  1 Patch TransDERmal DAILY Nelson Fabiola, NP   1 Patch at 11/16/22 0695    ARIPiprazole (ABILIFY) tablet 10 mg  10 mg Oral DAILY Nelson Lorimor, NP   10 mg at 11/16/22 0907         Mental Status Exam:  Eye contact:   Grooming: fair  Psychomotor activity:   Speech is spontaneous  Mood is \" \"  Affect:  Perception:  Suicidal ideation:   Cognition is grossly intact         Physical Exam:  Body habitus: Body mass index is 23.06 kg/m². Musculoskeletal system: normal gait  Tremor - neg  Cog wheeling - neg      Assessment and Plan:  Ney Stauffer meets criteria for a diagnosis of bipolar disorder, current episode depressed. INCREASE Abilify to 20 mg daily  Continue the medication regimen as prescribed  Disposition planning to continue.    A coordinated, multidisplinary treatment team round was conducted with the patient, nurses, pharmcist,  and writer present. Discussions held with , and/or with family members; Complete current electronic health record for patient was reviewed in full including consultant notes, ancillary staff notes, nurses and tech notes, labs and vitals. I certify that this patients inpatient psychiatric hospital services furnished since the previous certification were, and continue to be, required for treatment that could reasonably be expected to improve the patient's condition, or for diagnostic study, and that the patient continues to need, on a daily basis, active treatment furnished directly by or requiring the supervision of inpatient psychiatric facility personnel. In addition, the hospital records show that services furnished were intensive treatment services, admission or related services, or equivalent services. 1 Principal Discharge DX:	Headache

## 2022-11-16 NOTE — PROGRESS NOTES
Problem: Depressed Mood (Adult/Pediatric)  Goal: *STG: Participates in treatment plan  Outcome: Progressing Towards Goal  Goal: *STG: Verbalizes anger, guilt, and other feelings in a constructive manor  Outcome: Progressing Towards Goal  Goal: *STG: Attends activities and groups  Outcome: Progressing Towards Goal  Goal: *STG: Demonstrates reduction in symptoms and increase in insight into coping skills/future focused  Outcome: Progressing Towards Goal    0715: received patient resting quietly in bed no

## 2022-11-16 NOTE — H&P
[address] [facility]  History and Physical    Date of Service:  11/15/2022  Primary Care Provider: None  Source of information: The patient and Chart review    Chief Complaint: No chief complaint on file. History of Presenting Illness:   Virginie Boudreaux is a 38 y.o. male with a PMH of bipolar who presented to [facility] ED after being off his medications for two weeks and felt \"out of touch with reality\". He typically takes abilify and lithium for his bipolar. He states he has dipped for about 25 years and is wearing a nicotine patch. Denies drug or alcohol use. He states he received two Pzifer covid vaccinations. Hospitalist team evaluated patient for H&P. Throughout assessment he was calm and cooperative. The patient denies any headache, blurry vision, sore throat, trouble swallowing, trouble with speech, chest pain, SOB, cough, fever, chills, N/V/D, abd pain, urinary symptoms, constipation. The only complaint he had was some sciatica pain which he stretches to improve. REVIEW OF SYSTEMS:  A comprehensive review of systems was negative except for that written in the History of Present Illness. Past Medical History:   Diagnosis Date    Psychiatric disorder     phobias      No past surgical history on file. Prior to Admission medications    Medication Sig Start Date End Date Taking? Authorizing Provider   ARIPiprazole (ABILIFY) 20 mg tablet Take 20 mg by mouth daily. Provider, Historical   lithium carbonate 300 mg capsule Take 300 mg by mouth two (2) times daily (with meals). Other, MD Javier     Allergies   Allergen Reactions    Tetracycline Unknown (comments)     Clancy strange      Family History   Problem Relation Age of Onset    No Known Problems Mother     No Known Problems Father       Social History:  reports that he has never smoked. He does not have any smokeless tobacco history on file. He reports current alcohol use.  He reports that he does not use drugs. Family and social history were personally reviewed, all pertinent and relevant details are outlined as above. Objective:   Visit Vitals  /75 (BP 1 Location: Left upper arm, BP Patient Position: Sitting)   Pulse 85   Temp 97.8 °F (36.6 °C)   Resp 16   Ht 5' 11\" (1.803 m)   Wt 75 kg (165 lb 5.5 oz)   SpO2 99%   BMI 23.06 kg/m²           PHYSICAL EXAM:   General: Alert x oriented x 3, awake, no acute distress, pleasant male, appears to be stated age  [de-identified]: PEERL, moist mucus membranes  Neck: Supple, no JVD  Chest: Clear to auscultation bilaterally   CVS: RRR, S1 S2 heard, no murmurs/rubs/gallops  Abd: Soft, non-tender, non-distended, +bowel sounds   Ext: No clubbing, no cyanosis, no edema  Neuro/Psych: Pleasant mood and affect, CN 2-12 grossly intact, sensory grossly within normal limit, Strength 5/5 in all extremities, DTR 1+ x 4  Cap refill: Brisk, less than 3 seconds  Pulses: 2+, symmetric in all extremities  Skin: Warm, dry, without rashes or lesions    Data Review: All diagnostic labs and studies have been reviewed. Abnormal Labs Reviewed - No data to display    All Micro Results       None            IMAGING:   No orders to display        ECG/ECHO:  No results found for this or any previous visit. Assessment:   Given the patient's current clinical presentation, there is a high level of concern for decompensation if discharged from the emergency department. Complex decision making was performed, which includes reviewing the patient's available past medical records, laboratory results, and imaging studies.     Active Problems:    Bipolar disorder current episode depressed ([facility] [address] (11/15/2022)      Plan:     Tobacco use disorder:  -nicotine patch ordered  -counseled on cessation    Sciatica:  -Tylenol and stretching    Bipolar:  -managed by primary team/psychiatry  -lithium and abilify ordered    Thank you for allowing us to participate in this patient's care, please reconsult for further support, signing off. Signed By: Melody Kennedy NP     November 15, 2022         Please note that this dictation may have been completed with Dinorah Diaz, the computer voice recognition software. Quite often unanticipated grammatical, syntax, homophones, and other interpretive errors are inadvertently transcribed by the computer software. Please disregard these errors. Please excuse any errors that have escaped final proofreading.

## 2022-11-16 NOTE — PROGRESS NOTES
Problem: Falls - Risk of  Goal: *Absence of Falls  Description: Document Wallace Quezada Fall Risk and appropriate interventions in the flowsheet. Outcome: Progressing Towards Goal  Note: Fall Risk Interventions:            Medication Interventions: Teach patient to arise slowly    Resting in bed with eyes closed, no complaints, no distress noted. Safety measures in place, will continue to monitor.

## 2022-11-16 NOTE — BH NOTES
GROUP THERAPY PROGRESS NOTE    Patient did not participate in recreational therapy group.     BENY Hair, HP-A

## 2022-11-16 NOTE — INTERDISCIPLINARY ROUNDS
Behavioral Health Interdisciplinary Rounds     Patient Name: Patricio Raygoza  Age: 38 y.o. Room/Bed:  729/  Primary Diagnosis: <principal problem not specified>   Admission Status: Voluntary     Readmission within 30 days: no  Power of  in place: no  Patient requires a blocked bed: no          Reason for blocked bed:       Flu Vaccine :    Consults:          Labs/Testing due today? Have they refused labs?: yes    Sleep hours: 7       Participation in Care/Groups:    Medication Compliant?: Yes  PRNS (last 24 hours): None    Restraints (last 24 hours):  no     CIWA (range last 24 hours):     COWS (range last 24 hours):      Alcohol screening (AUDIT) completed -         If applicable, date SBIRT discussed in treatment team AND documented:   AUDIT Screen Score:        Document Brief Intervention (corresponds directly with the 5 A's, Ask, Advise, Assess, Assist, and Arrange): At- Risk Patients (Score 7-15 for women; 8-15 for men)  Discuss concern patient is drinking at unhealthy levels known to increase risk of alcohol-related health problems. Is Patient ready to commit to change? If No:  Encourage reflection  Discuss short term and long term health risks of consuming alcohol  Barriers to change  Reaffirm willingness to help / Educational materials provided  If Yes:  Set goal  Plan  Educational materials provided    Harmful use or Dependence (Score 16 or greater)  Discuss short term and long term health risks of consuming alcohol  Recommendations  Negotiate drinking goal  Recommend addiction specialist/center  Arrange follow-up appointments.     Tobacco - patient is a smoker: Have You Used Tobacco in the Past 30 Days: Yes  Illegal Drugs use: Have You Used Any Illegal Substances Over the Past 12 Months: No    24 hour chart check complete: yes   ____________________________________________________________________________________________________________    Patient goal(s) for today:   Treatment team focus/goals:   Progress note                                  LOS:  2  Expected LOS:     Financial concerns/prescription coverage:    Family contact:       Family requesting physician contact today:    Discharge plan:   Access to weapons :         Outpatient provider(s):   Patient's preferred phone number for follow up call :   Patient's preferred e-mail address :  Participating treatment team members: Nael Alicea, * (assigned SW),

## 2022-11-16 NOTE — BH NOTES
GROUP THERAPY PROGRESS NOTE    Patient did not participate in psychotherapy group.     Iva Jones MSW, QMHP-A

## 2022-11-16 NOTE — PROGRESS NOTES
Problem: Depressed Mood (Adult/Pediatric)  Goal: *STG: Participates in treatment plan  Outcome: Progressing Towards Goal     Problem: Depressed Mood (Adult/Pediatric)  Goal: *STG: Verbalizes anger, guilt, and other feelings in a constructive manor  Outcome: Progressing Towards Goal

## 2022-11-17 PROCEDURE — 74011250637 HC RX REV CODE- 250/637

## 2022-11-17 PROCEDURE — 65220000003 HC RM SEMIPRIVATE PSYCH

## 2022-11-17 RX ADMIN — ACETAMINOPHEN 650 MG: 325 TABLET ORAL at 12:10

## 2022-11-17 RX ADMIN — ACETAMINOPHEN 650 MG: 325 TABLET ORAL at 16:39

## 2022-11-17 RX ADMIN — ACETAMINOPHEN 650 MG: 325 TABLET ORAL at 20:51

## 2022-11-17 RX ADMIN — ARIPIPRAZOLE 20 MG: 10 TABLET ORAL at 09:51

## 2022-11-17 RX ADMIN — LITHIUM CARBONATE 300 MG: 300 TABLET ORAL at 09:51

## 2022-11-17 RX ADMIN — LITHIUM CARBONATE 300 MG: 300 TABLET ORAL at 20:50

## 2022-11-17 NOTE — PROGRESS NOTES
Problem: Depressed Mood (Adult/Pediatric)  Goal: *STG: Participates in treatment plan  Outcome: Progressing Towards Goal  Note: Out on unit for meals, passively engaged, declined to attend group. Denies SI, no self harming behaviors. States medications working and he feels his thoughts are more organzied, denies SI, states his negative thoughts are less intense.    Goal: *STG: Demonstrates reduction in symptoms and increase in insight into coping skills/future focused  Outcome: Progressing Towards Goal  Goal: Interventions  Outcome: Progressing Towards Goal

## 2022-11-17 NOTE — INTERDISCIPLINARY ROUNDS
Behavioral Health Interdisciplinary Rounds     Patient Name: Tomas Juárez  Age: 38 y.o. Room/Bed:  729/  Primary Diagnosis: <principal problem not specified>   Admission Status: Voluntary     Readmission within 30 days: no  Power of  in place: no  Patient requires a blocked bed: no           Sleep hours:       Participation in Care/Groups:    Medication Compliant?: Yes  PRNS (last 24 hours): None    Restraints (last 24 hours):  no     Alcohol screening (AUDIT) completed -     If applicable, date SBIRT discussed in treatment team AND documented:    Tobacco - patient is a smoker: Have You Used Tobacco in the Past 30 Days: Yes  Illegal Drugs use: Have You Used Any Illegal Substances Over the Past 12 Months: No    24 hour chart check complete: yes     _______________________________________________    Patient goal(s) for today: Communicate needs to staff  Treatment team focus/goals: assess pt, manage medications, discharge planning   Progress note: Pt reports having trouble sleeping and sciatic pain. Pt denies SI. Pts medications, adjusted.  SW will help establish follow up    Financial concerns/prescription coverage:    Family contact:                        Family requesting physician contact today:    Discharge plan: TBD   Access to weapons No :                                                              Outpatient provider(s): Medicare   Patient's preferred phone number for follow up call :   Patient's preferred e-mail address :  Participating treatment team members:    LOS:  3 Expected LOS: 11/21/11    Participating treatment team members: Tomas Marquita, BENY Rodriguez, Melodie Beard, Intern, Em Hudson RN, Norris Fitch, NP

## 2022-11-17 NOTE — PROGRESS NOTES
Problem: Falls - Risk of  Goal: *Absence of Falls  Description: Document Dennie Oak Fall Risk and appropriate interventions in the flowsheet. Outcome: Progressing Towards Goal  Note: Fall Risk Interventions:            Medication Interventions: Teach patient to arise slowly    Elimination Interventions: Toilet paper/wipes in reach       At present:patient resting with eyes closed ,resp even regular and unlabored. No distress noted. Q 15 min checks are ongoing for safety.

## 2022-11-17 NOTE — PROGRESS NOTES
MUSIC THERAPY GROUP PROGRESS NOTE        11/17/2022    The patient Ney souza 38 y.o. male participated in Music Therapy group on [address] [facility]. Group time: 11:00-11:45am     Personal goal for participation: Patient (pt) will actively participate in at least one-third of the group time. Goal orientation: Socio-emotional     Group therapy participation: Active     Therapeutic interventions: Group drumming, Empathic listening     Observation of participation: The patient actively participated in the entirety of the Music Therapy Group time for which he was present. He entered about group home through the group time. Pt selected which rhythm instruments he wanted to play, increased self-expression while using his instrument, and appeared to actively listen to other group members. In response to group members being asked to think of a feeling they've had in the past few days and play what it sounds like to them on their instrument, then share (if they choose) what it was, pt played this on his instrument and shared he'd felt sadness. MT provided supportive presence, empathic listening, and invited the group to play her rhythm again with her to provide validation through music.      Andrzej Fischer MT-BC (Music Therapist-Board Certified)  Spiritual Care Department  Referral-based service

## 2022-11-17 NOTE — PROGRESS NOTES
Chief Complaint: \"I am feeling okay. \"    Length of Stay: 3 Days    Interval History: Miladys Early reports he is having back sciatic pain. He has had 8 injections in the past and this is not a new issue for him. He went to the music group today. He states he has been trying to think about the future and he thinks staying with his family in [place] will be a good idea. He reports he had a little trouble sleeping last night due to noise in the hallway. He denies SI/HI/AVH currently. Appetite is good. He states he is not getting enough to eat currently. He reports mood is about the same today. He denies SI/HI/AVH currently. Past Medical History:  Past Medical History:   Diagnosis Date    Psychiatric disorder     phobias           Labs:  Lab Results   Component Value Date/Time    WBC 8.7 11/14/2022 03:42 PM    HGB 14.4 11/14/2022 03:42 PM    HCT 43.1 11/14/2022 03:42 PM    PLATELET 589 77/09/8628 03:42 PM    MCV 94.5 11/14/2022 03:42 PM      Lab Results   Component Value Date/Time    Sodium 139 11/14/2022 03:42 PM    Potassium 3.6 11/14/2022 03:42 PM    Chloride 105 11/14/2022 03:42 PM    CO2 28 11/14/2022 03:42 PM    Anion gap 6 11/14/2022 03:42 PM    Glucose 172 (H) 11/14/2022 03:42 PM    BUN 12 11/14/2022 03:42 PM    Creatinine 1.19 11/14/2022 03:42 PM    BUN/Creatinine ratio 10 (L) 11/14/2022 03:42 PM    Calcium 8.9 11/14/2022 03:42 PM    Bilirubin, total 0.4 11/14/2022 03:42 PM    Alk.  phosphatase 84 11/14/2022 03:42 PM    Protein, total 6.8 11/14/2022 03:42 PM    Albumin 3.7 11/14/2022 03:42 PM    Globulin 3.1 11/14/2022 03:42 PM    A-G Ratio 1.2 11/14/2022 03:42 PM    ALT (SGPT) 16 11/14/2022 03:42 PM      Vitals:    11/15/22 2025 11/16/22 0927 11/16/22 1752 11/17/22 0741   BP: 110/75 110/75 114/78 109/74   Pulse: 85 (!) 105 (!) 111 77   Resp: 16 16 16 16   Temp: 97.8 °F (36.6 °C) 97.6 °F (36.4 °C) 97.5 °F (36.4 °C) 97.6 °F (36.4 °C)   SpO2: 99% 99% 99% 100%   Weight:       Height: Current Facility-Administered Medications   Medication Dose Route Frequency Provider Last Rate Last Admin    ARIPiprazole (ABILIFY) tablet 20 mg  20 mg Oral DAILY Ozella Arias A, NP   20 mg at 11/17/22 0951    OLANZapine (ZyPREXA) tablet 5 mg  5 mg Oral Q6H PRN Kerry Mann, NP        haloperidol lactate (HALDOL) injection 5 mg  5 mg IntraMUSCular Q6H PRN [facility] Johnathan, NP        benztropine (COGENTIN) tablet 1 mg  1 mg Oral BID PRN [facility], NP        diphenhydrAMINE (BENADRYL) injection 50 mg  50 mg IntraMUSCular BID PRN [facility], NP        hydrOXYzine HCL (ATARAX) tablet 50 mg  50 mg Oral TID [facility]N [facility], [facility]        traZODone (DESYREL) tablet 50 mg  50 mg Oral QHS PRN [facility], NP        acetaminophen (TYLENOL) tablet 650 mg  650 mg Oral Q4H PRN [facility], NP   650 mg at 11/16/22 1654    magnesium hydroxide (MILK OF MAGNESIA) 400 mg/5 mL oral suspension 30 mL  30 mL Oral DAILY PRN [facility], NP        midazolam (VERSED) injection 2 mg  2 mg IntraMUSCular Q6H PRN [facility] John[facility], BRISEYDA        lithium carbonate tablet 300 mg  300 mg Oral BID Ozella Arias A, NP   300 mg at 11/17/22 0951    nicotine (NICODERM CQ) 21 mg/24 hr patch 1 Patch  1 Patch TransDERmal DAILY [facility] BRISEYDA   1 Patch at 11/17/22 7179         Mental Status Exam:  Eye contact: good  Grooming: fair  Psychomotor activity: WNL  Speech is spontaneous  Mood is \"okay \"  Affect: depressed  Perception: Denies AVH  Suicidal ideation: Denies SI  Cognition is grossly intact         Physical Exam:  Body habitus: Body mass index is 23.06 kg/m². Musculoskeletal system: normal gait  Tremor - neg  Cog wheeling - neg      Assessment and Plan:  Marisol Ott meets criteria for a diagnosis of bipolar disorder, current episode depressed. Continue the medication regimen as prescribed  Disposition planning to continue.    A coordinated, multidisplinary treatment team round was conducted with the patient, nurses, pharmcist,  and writer present. Discussions held with , and/or with family members; Complete current electronic health record for patient was reviewed in full including consultant notes, ancillary staff notes, nurses and tech notes, labs and vitals. I certify that this patients inpatient psychiatric hospital services furnished since the previous certification were, and continue to be, required for treatment that could reasonably be expected to improve the patient's condition, or for diagnostic study, and that the patient continues to need, on a daily basis, active treatment furnished directly by or requiring the supervision of inpatient psychiatric facility personnel. In addition, the hospital records show that services furnished were intensive treatment services, admission or related services, or equivalent services.

## 2022-11-17 NOTE — BH NOTES
GROUP THERAPY PROGRESS NOTE    Patient did not participate in self-care group.     Rufus Sams MSW, HP-A

## 2022-11-17 NOTE — BH NOTES
GROUP THERAPY PROGRESS NOTE    Patient did not participate in Psychotherapy group.     BENY Saxena, [facility]-A

## 2022-11-18 PROCEDURE — 74011250637 HC RX REV CODE- 250/637

## 2022-11-18 PROCEDURE — 65220000003 HC RM SEMIPRIVATE PSYCH

## 2022-11-18 RX ORDER — BACLOFEN 10 MG/1
5 TABLET ORAL 3 TIMES DAILY
Status: DISCONTINUED | OUTPATIENT
Start: 2022-11-18 | End: 2022-11-21 | Stop reason: HOSPADM

## 2022-11-18 RX ADMIN — LITHIUM CARBONATE 300 MG: 300 TABLET ORAL at 21:28

## 2022-11-18 RX ADMIN — ARIPIPRAZOLE 20 MG: 10 TABLET ORAL at 08:37

## 2022-11-18 RX ADMIN — ACETAMINOPHEN 650 MG: 325 TABLET ORAL at 08:37

## 2022-11-18 RX ADMIN — BACLOFEN 5 MG: 10 TABLET ORAL at 15:23

## 2022-11-18 RX ADMIN — LITHIUM CARBONATE 300 MG: 300 TABLET ORAL at 08:37

## 2022-11-18 RX ADMIN — BACLOFEN 5 MG: 10 TABLET ORAL at 21:28

## 2022-11-18 NOTE — PROGRESS NOTES
Chief Complaint: \"I am feeling okay. \"    Length of Stay: 4 Days    Interval History: Alyssa Monroe reports he is still having back sciatic pain. Discussed options. He reports mood is a little better today. He states he is being hard on himself about how he got here though. He is sleeping and eating well. He is medication compliant and has no side effects to report currently. He denies SI/HI/AVH currently. Past Medical History:  Past Medical History:   Diagnosis Date    Psychiatric disorder     phobias           Labs:  Lab Results   Component Value Date/Time    WBC 8.7 11/14/2022 03:42 PM    HGB 14.4 11/14/2022 03:42 PM    HCT 43.1 11/14/2022 03:42 PM    PLATELET 652 97/08/5490 03:42 PM    MCV 94.5 11/14/2022 03:42 PM      Lab Results   Component Value Date/Time    Sodium 139 11/14/2022 03:42 PM    Potassium 3.6 11/14/2022 03:42 PM    Chloride 105 11/14/2022 03:42 PM    CO2 28 11/14/2022 03:42 PM    Anion gap 6 11/14/2022 03:42 PM    Glucose 172 (H) 11/14/2022 03:42 PM    BUN 12 11/14/2022 03:42 PM    Creatinine 1.19 11/14/2022 03:42 PM    BUN/Creatinine ratio 10 (L) 11/14/2022 03:42 PM    Calcium 8.9 11/14/2022 03:42 PM    Bilirubin, total 0.4 11/14/2022 03:42 PM    Alk.  phosphatase 84 11/14/2022 03:42 PM    Protein, total 6.8 11/14/2022 03:42 PM    Albumin 3.7 11/14/2022 03:42 PM    Globulin 3.1 11/14/2022 03:42 PM    A-G Ratio 1.2 11/14/2022 03:42 PM    ALT (SGPT) 16 11/14/2022 03:42 PM      Vitals:    11/16/22 1752 11/17/22 0741 11/17/22 1552 11/18/22 0753   BP: 114/78 109/74 101/67 117/78   Pulse: (!) 111 77 91 (!) 109   Resp: 16 16 15 16   Temp: 97.5 °F (36.4 °C) 97.6 °F (36.4 °C) 98.2 °F (36.8 °C) 97.8 °F (36.6 °C)   SpO2: 99% 100% 99% 100%   Weight:       Height:             Current Facility-Administered Medications   Medication Dose Route Frequency Provider Last Rate Last Admin    ARIPiprazole (ABILIFY) tablet 20 mg  20 mg Oral DAILY Johnny CHACKO NP   20 mg at 11/18/22 0837    OLANZapine (ZyPREXA) tablet 5 mg  5 mg Oral Q6H PRN Elverna Lav, NP        haloperidol lactate (HALDOL) injection 5 mg  5 mg IntraMUSCular Q6H PRN Elverna Lav, NP        benztropine (COGENTIN) tablet 1 mg  1 mg Oral BID PRN Elverna Lav, NP        diphenhydrAMINE (BENADRYL) injection 50 mg  50 mg IntraMUSCular BID PRN Elverna Lav, NP        hydrOXYzine HCL (ATARAX) tablet 50 mg  50 mg Oral TID PRN Elverna Lav, NP        traZODone (DESYREL) tablet 50 mg  50 mg Oral QHS PRN Elverna Lav, NP        acetaminophen (TYLENOL) tablet 650 mg  650 mg Oral Q4H PRN Elverna Lav, NP   650 mg at 11/18/22 0222    magnesium hydroxide (MILK OF MAGNESIA) 400 mg/5 mL oral suspension 30 mL  30 mL Oral DAILY PRN Elverna Lav, NP        midazolam (VERSED) injection 2 mg  2 mg IntraMUSCular Q6H PRN Elverna Lav, NP        lithium carbonate tablet 300 mg  300 mg Oral BID Amber Guess A, NP   300 mg at 11/18/22 6201    nicotine (NICODERM CQ) 21 mg/24 hr patch 1 Patch  1 Patch TransDERmal DAILY Marianna Mock, NP   1 Patch at 11/18/22 4997         Mental Status Exam:  Eye contact: good  Grooming: fair  Psychomotor activity: WNL  Speech is spontaneous  Mood is \"okay \"  Affect: depressed  Perception: Denies AVH  Suicidal ideation: Denies SI  Cognition is grossly intact         Physical Exam:  Body habitus: Body mass index is 23.06 kg/m². Musculoskeletal system: normal gait  Tremor - neg  Cog wheeling - neg      Assessment and Plan:  Sunshine Martin meets criteria for a diagnosis of bipolar disorder, current episode depressed. Continue the medication regimen as prescribed  Disposition planning to continue. A coordinated, multidisplinary treatment team round was conducted with the patient, nurses, pharmcist,  and writer present. Discussions held with , and/or with family members;  Complete current electronic health record for patient was reviewed in full including consultant notes, ancillary staff notes, nurses and tech notes, labs and vitals. I certify that this patients inpatient psychiatric hospital services furnished since the previous certification were, and continue to be, required for treatment that could reasonably be expected to improve the patient's condition, or for diagnostic study, and that the patient continues to need, on a daily basis, active treatment furnished directly by or requiring the supervision of inpatient psychiatric facility personnel. In addition, the hospital records show that services furnished were intensive treatment services, admission or related services, or equivalent services.

## 2022-11-18 NOTE — PROGRESS NOTES
Problem: Falls - Risk of  Goal: *Absence of Falls  Description: Document Fierropaul Mackay Fall Risk and appropriate interventions in the flowsheet. Outcome: Progressing Towards Goal  Note: Fall Risk Interventions:    Medication Interventions: Teach patient to arise slowly    Elimination Interventions: Toilet paper/wipes in reach    Patient is resting quietly in bed with eyes closed. Appears to be asleep. No acute or respiratory distress noted. Will continue to monitor q15 min rounds.

## 2022-11-18 NOTE — INTERDISCIPLINARY ROUNDS
Behavioral Health Interdisciplinary Rounds     Patient Name: Carlena Goodpasture  Age: 38 y.o. Room/Bed:  [facility]/  Primary Diagnosis: <principal problem not specified>   Admission Status: Voluntary     Readmission within 30 days: no  Power of  in place: no  Patient requires a blocked bed: no          Reason for blocked bed:   Sleep hours:        Participation in Care/Groups:  no  Medication Compliant?: Yes  PRNS (last 24 hours): Pain    Restraints (last 24 hours):  no     Alcohol screening (AUDIT) completed -     If applicable, date SBIRT discussed in treatment team AND documented:    Tobacco - patient is a smoker: Have You Used Tobacco in the Past 30 Days: Yes  Illegal Drugs use: Have You Used Any Illegal Substances Over the Past 12 Months: No    24 hour chart check complete: yes     _______________________________________________    Patient goal(s) for today: Communicate needs to staff  Treatment team focus/goals: Assess pt, manage medications, discharge planning   Progress note: Pt is stable. Pt denies SI, HI, AVH. Pt still endorses sciatic pain. Medications adjusted. Pt is med compliant. Lithium level scheduled for Saturday AM. Pt has follow up scheduled. Financial concerns/prescription coverage:    Family contact:                        Family requesting physician contact today:    Discharge plan: PT will return home after discharge   Access to weapons no :                                                               Outpatient provider(s): [facility]   Patient's preferred phone number for follow up call :   Patient's preferred e-mail address :  Participating treatment team members:    LOS:  4 Expected LOS: 11/21/22    Participating treatment team members: Carlena Goodpasture, Neeta Calderon, RN, Eligio Cook, NP

## 2022-11-18 NOTE — DISCHARGE INSTRUCTIONS
DISCHARGE SUMMARY    NAME:Marco Antonio Alicea  : 1980  MRN: [id number]    The patient Katie Carver exhibits the ability to control behavior in a less restrictive environment. Patient's level of functioning is improving. No assaultive/destructive behavior has been observed for the past 24 hours. No suicidal/homicidal threat or behavior has been observed for the past 24 hours. There is no evidence of serious medication side effects. Patient has not been in physical or protective restraints for at least the past 24 hours. If weapons involved, how are they secured? none    Is patient aware of and in agreement with discharge plan? yes    Arrangements for medication:  Prescriptions sent to pt pharmacy     Copy of discharge instructions to provider?:  faxed to [address] [place] [address] Bella    Arrangements for transportation home:  Family will      Keep all follow up appointments as scheduled, continue to take prescribed medications per physician instructions. Mental health crisis number:  [phone] or your local mental health crisis line number at [place] [address] Emergency WARM LINE      [phone] ([phone])      M-F: 9am to 9pm      Sat & Sun: 27[address] [facility] suicide prevention lines:                             9-891-VQHAVVM ([phone]       [phone] ([phone])    Crisis Text Line:  Text HOME to [place] from Nurse    PATIENT INSTRUCTIONS:    What to do at Home:  Recommended activity: Activity as tolerated,         *  Please give a list of your current medications to your Primary Care Provider. *  Please update this list whenever your medications are discontinued, doses are      changed, or new medications (including over-the-counter products) are added. *  Please carry medication information at all times in case of emergency situations.     These are general instructions for a healthy lifestyle:    No smoking/ No tobacco products/ Avoid exposure to second hand smoke  Surgeon General's Warning:  Quitting smoking now greatly reduces serious risk to your health. Obesity, smoking, and sedentary lifestyle greatly increases your risk for illness    A healthy diet, regular physical exercise & weight monitoring are important for maintaining a healthy lifestyle    You may be retaining fluid if you have a history of heart failure or if you experience any of the following symptoms:  Weight gain of 3 pounds or more overnight or 5 pounds in a week, increased swelling in our hands or feet or shortness of breath while lying flat in bed. Please call your doctor as soon as you notice any of these symptoms; do not wait until your next office visit. The discharge information has been reviewed with the patient. The patient verbalized understanding. Discharge medications reviewed with the patient and appropriate educational materials and side effects teaching were provided.   ___________________________________________________________________________________________________________________________________

## 2022-11-19 LAB
DATE LAST DOSE: ABNORMAL
LITHIUM SERPL-SCNC: 0.44 MMOL/L (ref 0.6–1.2)
REPORTED DOSE,DOSE: ABNORMAL UNITS
REPORTED DOSE/TIME,TMG: ABNORMAL

## 2022-11-19 PROCEDURE — 80178 ASSAY OF LITHIUM: CPT

## 2022-11-19 PROCEDURE — 74011250637 HC RX REV CODE- 250/637

## 2022-11-19 PROCEDURE — 36415 COLL VENOUS BLD VENIPUNCTURE: CPT

## 2022-11-19 PROCEDURE — 65220000003 HC RM SEMIPRIVATE PSYCH

## 2022-11-19 RX ADMIN — BACLOFEN 5 MG: 10 TABLET ORAL at 08:57

## 2022-11-19 RX ADMIN — LITHIUM CARBONATE 300 MG: 300 TABLET ORAL at 21:21

## 2022-11-19 RX ADMIN — ARIPIPRAZOLE 20 MG: 10 TABLET ORAL at 08:57

## 2022-11-19 RX ADMIN — LITHIUM CARBONATE 300 MG: 300 TABLET ORAL at 08:57

## 2022-11-19 RX ADMIN — ACETAMINOPHEN 650 MG: 325 TABLET ORAL at 08:57

## 2022-11-19 RX ADMIN — BACLOFEN 5 MG: 10 TABLET ORAL at 21:21

## 2022-11-19 RX ADMIN — ACETAMINOPHEN 650 MG: 325 TABLET ORAL at 17:08

## 2022-11-19 RX ADMIN — BACLOFEN 5 MG: 10 TABLET ORAL at 17:08

## 2022-11-19 NOTE — PROGRESS NOTES
Problem: Falls - Risk of  Goal: *Absence of Falls  Description: Document Clearence Skates Fall Risk and appropriate interventions in the flowsheet. Outcome: Progressing Towards Goal  Note: Fall Risk Interventions:     Medication Interventions: Teach patient to arise slowly    Elimination Interventions: Toilet paper/wipes in reach    Problem: Depressed Mood (Adult/Pediatric)  Goal: *STG: Verbalizes anger, guilt, and other feelings in a constructive manor  Outcome: Progressing Towards Goal  Goal: *STG: Demonstrates reduction in symptoms and increase in insight into coping skills/future focused  Outcome: Progressing Towards Goal  Goal: Interventions  Outcome: Progressing Towards Goal     Pt isolative to room. Denies current SI/HI/AVH. Remains medication and meal compliant. Will continue to monitor q15 minutes for safety.

## 2022-11-19 NOTE — PROGRESS NOTES
Patient resting in bed with eyes closed. No complaints or distress noted. Safety measures in place. Will continue to monitor q15 minutes. Problem: Falls - Risk of  Goal: *Absence of Falls  Description: Document Wu Lees Fall Risk and appropriate interventions in the flowsheet. Outcome: Progressing Towards Goal  Note: Fall Risk Interventions:            Medication Interventions: Teach patient to arise slowly    Elimination Interventions:  Toilet paper/wipes in reach

## 2022-11-19 NOTE — PROGRESS NOTES
Chief Complaint: \"I am feeling good. \"    Length of Stay: 5 Days    Interval History: Patricio Raygoza states he is doing well. Feels his mood is improved. Denies SI/plan/intent, HI/plan/intent, denies AVH. Eating and sleeping well. Back pain has improved. Taking meds as prescribed, no side effects reported or observed. No other changes or new concerns at this time. Past Medical History:  Past Medical History:   Diagnosis Date    Psychiatric disorder     phobias       Labs:  Lab Results   Component Value Date/Time    WBC 8.7 11/14/2022 03:42 PM    HGB 14.4 11/14/2022 03:42 PM    HCT 43.1 11/14/2022 03:42 PM    PLATELET 033 27/64/4095 03:42 PM    MCV 94.5 11/14/2022 03:42 PM      Lab Results   Component Value Date/Time    Sodium 139 11/14/2022 03:42 PM    Potassium 3.6 11/14/2022 03:42 PM    Chloride 105 11/14/2022 03:42 PM    CO2 28 11/14/2022 03:42 PM    Anion gap 6 11/14/2022 03:42 PM    Glucose 172 (H) 11/14/2022 03:42 PM    BUN 12 11/14/2022 03:42 PM    Creatinine 1.19 11/14/2022 03:42 PM    BUN/Creatinine ratio 10 (L) 11/14/2022 03:42 PM    Calcium 8.9 11/14/2022 03:42 PM    Bilirubin, total 0.4 11/14/2022 03:42 PM    Alk.  phosphatase 84 11/14/2022 03:42 PM    Protein, total 6.8 11/14/2022 03:42 PM    Albumin 3.7 11/14/2022 03:42 PM    Globulin 3.1 11/14/2022 03:42 PM    A-G Ratio 1.2 11/14/2022 03:42 PM    ALT (SGPT) 16 11/14/2022 03:42 PM      Vitals:    11/17/22 1552 11/18/22 0753 11/18/22 1514 11/19/22 0856   BP: 101/67 117/78 119/79 113/75   Pulse: 91 (!) 109 (!) 108 (!) 101   Resp: 15 16 16 16   Temp: 98.2 °F (36.8 °C) 97.8 °F (36.6 °C) 97.9 °F (36.6 °C) 97.5 °F (36.4 °C)   SpO2: 99% 100% 100% 96%   Weight:       Height:             Current Facility-Administered Medications   Medication Dose Route Frequency Provider Last Rate Last Admin    baclofen (LIORESAL) tablet 5 mg  5 mg Oral TID Olivia CHACKO NP   5 mg at 11/19/22 0857    ARIPiprazole (ABILIFY) tablet 20 mg  20 mg Oral DAILY Inez Metzger NP 20 mg at 11/19/22 0857    OLANZapine (ZyPREXA) tablet 5 mg  5 mg Oral Q6H PRN [facility] Haylie, BRISEYDA        haloperidol lactate (HALDOL) injection 5 mg  5 mg IntraMUSCular Q6H PRN [place], NP        benztropine (COGENTIN) tablet 1 mg  1 [facility]        diphenhydrAMINE (BENADRYL) injection 50 mg  50 mg IntraMUSCular BID PRN [place], NP        hydrOXYzine HCL (ATARAX) tablet 50 mg  50 mg Oral TID PRN [facility], NP        traZODone (DESYREL) tablet 50 mg  50 mg Oral QHS PRN [facility], NP        acetaminophen (TYLENOL) tablet 650 mg  650 mg Oral Q4H PRN [facility], NP   650 mg at 11/19/22 0857    magnesium hydroxide (MILK OF MAGNESIA) 400 mg/5 mL oral suspension 30 mL  30 mL Oral DAILY PRN [facility], NP        midazolam (VERSED) injection 2 mg  2 mg IntraMUSCular Q6H PRN [facility], NP        lithium carbonate tablet 300 mg  300 mg Oral BID Joaquina Lojuan daniel CHACKO, NP   300 mg at 11/19/22 0857    nicotine (NICODERM CQ) 21 mg/24 hr patch 1 Patch  1 Patch TransDERmal DAILY Valerie Escobar NP   1 Patch at 11/19/22 0858       Mental Status Exam:  Eye contact: good  Grooming: fair  Psychomotor activity: WNL  Speech is spontaneous  Mood is \"okay \"  Affect: blunt  Perception: Denies AVH  Suicidal ideation: Denies SI  Cognition is grossly intact    Physical Exam:  Body habitus: Body mass index is 23.06 kg/m². Musculoskeletal system: normal gait  Tremor - neg  Cog wheeling - neg    Assessment and Plan:  Yovany Wan meets criteria for a diagnosis of bipolar disorder, current episode depressed. Continue the medication regimen as prescribed  Disposition planning to continue. A coordinated, multidisplinary treatment team round was conducted with the patient, nurses, pharmcist,  and writer present. Discussions held with , and/or with family members;  Complete current electronic health record for patient was reviewed in full including consultant notes, ancillary staff notes, nurses and tech notes, labs and vitals. I certify that this patients inpatient psychiatric hospital services furnished since the previous certification were, and continue to be, required for treatment that could reasonably be expected to improve the patient's condition, or for diagnostic study, and that the patient continues to need, on a daily basis, active treatment furnished directly by or requiring the supervision of inpatient psychiatric facility personnel. In addition, the hospital records show that services furnished were intensive treatment services, admission or related services, or equivalent services.

## 2022-11-19 NOTE — PROGRESS NOTES
Problem: Falls - Risk of  Goal: *Absence of Falls  Description: Document Mary Marking Fall Risk and appropriate interventions in the flowsheet. Outcome: Progressing Towards Goal  Note: Fall Risk Interventions:            Medication Interventions: Teach patient to arise slowly    Elimination Interventions: Toilet paper/wipes in reach              Problem: Patient Education: Go to Patient Education Activity  Goal: Patient/Family Education  Outcome: Progressing Towards Goal     Problem: Depressed Mood (Adult/Pediatric)  Goal: *STG: Participates in treatment plan  Outcome: Progressing Towards Goal  Note: Pt participated in treatment team. Out on the unit for small periods of time. Denies any A/V hallucinations. Denies any thoughts of self harm. Taking medications as scheduled.    Goal: Interventions  Outcome: Progressing Towards Goal     Problem: Patient Education: Go to Patient Education Activity  Goal: Patient/Family Education  Outcome: Progressing Towards Goal

## 2022-11-20 PROCEDURE — 74011250637 HC RX REV CODE- 250/637

## 2022-11-20 PROCEDURE — 65220000003 HC RM SEMIPRIVATE PSYCH

## 2022-11-20 RX ADMIN — ACETAMINOPHEN 650 MG: 325 TABLET ORAL at 21:18

## 2022-11-20 RX ADMIN — BACLOFEN 5 MG: 10 TABLET ORAL at 09:18

## 2022-11-20 RX ADMIN — LITHIUM CARBONATE 300 MG: 300 TABLET ORAL at 21:18

## 2022-11-20 RX ADMIN — ARIPIPRAZOLE 20 MG: 10 TABLET ORAL at 09:18

## 2022-11-20 RX ADMIN — BACLOFEN 5 MG: 10 TABLET ORAL at 21:18

## 2022-11-20 RX ADMIN — BACLOFEN 5 MG: 10 TABLET ORAL at 16:31

## 2022-11-20 RX ADMIN — LITHIUM CARBONATE 300 MG: 300 TABLET ORAL at 09:18

## 2022-11-20 NOTE — PROGRESS NOTES
Problem: Depressed Mood (Adult/Pediatric)  Goal: *STG: Participates in treatment plan  Outcome: Progressing Towards Goal  Goal: *STG: Verbalizes anger, guilt, and other feelings in a constructive manor  Outcome: Progressing Towards Goal  Goal: *STG: Demonstrates reduction in symptoms and increase in insight into coping skills/future focused  Outcome: Progressing Towards Goal   Pt alert and oriented. Pt calm/cooperative and meal/med compliant. Pt mostly isolative to room, but interacts approprietly with staff and peers.

## 2022-11-20 NOTE — PROGRESS NOTES
Problem: Falls - Risk of  Goal: *Absence of Falls  Description: Document Ace Regalado Fall Risk and appropriate interventions in the flowsheet. Outcome: Progressing Towards Goal  Note: Fall Risk Interventions:            Medication Interventions: Teach patient to arise slowly    Elimination Interventions: Toilet paper/wipes in reach    Resting in bed with eyes closed, no complaints, no distress noted. Safety measures in place, will continue to monitor.

## 2022-11-20 NOTE — PROGRESS NOTES
Chief Complaint: \"I am feeling good. \"    Length of Stay: 6 Days    Interval History: Valeriano Riggins states he is doing OK for the most part. He states he is bothered that his weight has increased, as he states he has never been this heavy before. Continues to feel his mood is improved. Denies SI/plan/intent, HI/plan/intent, denies AVH. Eating and sleeping well. Back pain remains improved. Taking meds as prescribed, no side effects reported or observed. No other changes or new concerns at this time. Lithium level 0.44 yesterday. Past Medical History:  Past Medical History:   Diagnosis Date    Psychiatric disorder     phobias       Labs:  Lab Results   Component Value Date/Time    WBC 8.7 11/14/2022 03:42 PM    HGB 14.4 11/14/2022 03:42 PM    HCT 43.1 11/14/2022 03:42 PM    PLATELET 392 90/93/3041 03:42 PM    MCV 94.5 11/14/2022 03:42 PM      Lab Results   Component Value Date/Time    Sodium 139 11/14/2022 03:42 PM    Potassium 3.6 11/14/2022 03:42 PM    Chloride 105 11/14/2022 03:42 PM    CO2 28 11/14/2022 03:42 PM    Anion gap 6 11/14/2022 03:42 PM    Glucose 172 (H) 11/14/2022 03:42 PM    BUN 12 11/14/2022 03:42 PM    Creatinine 1.19 11/14/2022 03:42 PM    BUN/Creatinine ratio 10 (L) 11/14/2022 03:42 PM    Calcium 8.9 11/14/2022 03:42 PM    Bilirubin, total 0.4 11/14/2022 03:42 PM    Alk.  phosphatase 84 11/14/2022 03:42 PM    Protein, total 6.8 11/14/2022 03:42 PM    Albumin 3.7 11/14/2022 03:42 PM    Globulin 3.1 11/14/2022 03:42 PM    A-G Ratio 1.2 11/14/2022 03:42 PM    ALT (SGPT) 16 11/14/2022 03:42 PM      Vitals:    11/18/22 1514 11/19/22 0856 11/19/22 1622 11/20/22 0827   BP: 119/79 113/75 103/72 116/62   Pulse: (!) 108 (!) 101 69 98   Resp: 16 16 16 16   Temp: 97.9 °F (36.6 °C) 97.5 °F (36.4 °C) 97.9 °F (36.6 °C) 97.1 °F (36.2 °C)   SpO2: 100% 96% 98% 98%   Weight:    72.2 kg (159 lb 3.2 oz)   Height:             Current Facility-Administered Medications   Medication Dose Route Frequency Provider Last Rate Last Admin    baclofen (LIORESAL) tablet 5 mg  5 mg Oral TID Ramirez Live, NP   5 mg at 11/20/22 4624    ARIPiprazole (ABILIFY) tablet 20 mg  20 mg Oral DAILY Linzie Elsie A, NP   20 mg at 11/20/22 0918    OLANZapine (ZyPREXA) tablet 5 mg  5 mg Oral Q6H PRN Cele Kobus, NP        haloperidol lactate (HALDOL) injection 5 mg  5 mg IntraMUSCular Q6H PRN Cele Kobus, NP        benztropine (COGENTIN) tablet 1 mg  1 mg Oral BID PRN Cele Kobus, NP        diphenhydrAMINE (BENADRYL) injection 50 mg  50 mg IntraMUSCular BID PRN Cele Kobus, NP        hydrOXYzine HCL (ATARAX) tablet 50 mg  50 mg Oral TID PRN Cele Kobus, NP        traZODone (DESYREL) tablet 50 mg  50 mg Oral QHS PRN Cele Kobus, NP        acetaminophen (TYLENOL) tablet 650 mg  650 mg Oral Q4H PRN Cele Kobus, NP   650 mg at 11/19/22 1708    magnesium hydroxide (MILK OF MAGNESIA) 400 mg/5 mL oral suspension 30 mL  30 mL Oral DAILY PRN Cele Kobus, NP        midazolam (VERSED) injection 2 mg  2 mg IntraMUSCular Q6H PRN Cele Kobus, NP        lithium carbonate tablet 300 mg  300 mg Oral BID Linzie Elsie A, NP   300 mg at 11/20/22 8861    nicotine (NICODERM CQ) 21 mg/24 hr patch 1 Patch  1 Patch TransDERmal DAILY Ramirez Live, NP   1 Patch at 11/20/22 0919       Mental Status Exam:  Eye contact: good  Grooming: fair  Psychomotor activity: WNL  Speech is spontaneous  Mood is \"okay \"  Affect: blunt  Perception: Denies AVH  Suicidal ideation: Denies SI  Cognition is grossly intact    Physical Exam:  Body habitus: Body mass index is 22.2 kg/m². Musculoskeletal system: normal gait  Tremor - neg  Cog wheeling - neg    Assessment and Plan:  Tomas Juárez meets criteria for a diagnosis of bipolar disorder, current episode depressed. Continue the medication regimen as prescribed. Lithium level 0.44 yesterday. Disposition planning to continue.    A coordinated, multidisplinary treatment team round was conducted with the patient, nurses, pharmcist, social worker and writer present. Discussions held with , and/or with family members; Complete current electronic health record for patient was reviewed in full including consultant notes, ancillary staff notes, nurses and tech notes, labs and vitals. I certify that this patients inpatient psychiatric hospital services furnished since the previous certification were, and continue to be, required for treatment that could reasonably be expected to improve the patient's condition, or for diagnostic study, and that the patient continues to need, on a daily basis, active treatment furnished directly by or requiring the supervision of inpatient psychiatric facility personnel. In addition, the hospital records show that services furnished were intensive treatment services, admission or related services, or equivalent services.

## 2022-11-21 VITALS
DIASTOLIC BLOOD PRESSURE: 86 MMHG | SYSTOLIC BLOOD PRESSURE: 121 MMHG | RESPIRATION RATE: 16 BRPM | TEMPERATURE: 97.7 F | HEART RATE: 98 BPM | BODY MASS INDEX: 22.29 KG/M2 | OXYGEN SATURATION: 99 % | WEIGHT: 159.2 LBS | HEIGHT: 71 IN

## 2022-11-21 PROCEDURE — 74011250637 HC RX REV CODE- 250/637

## 2022-11-21 RX ORDER — LITHIUM CARBONATE 300 MG
300 TABLET ORAL 2 TIMES DAILY
Qty: 60 TABLET | Refills: 0 | Status: SHIPPED | OUTPATIENT
Start: 2022-11-21 | End: 2022-12-21

## 2022-11-21 RX ORDER — IBUPROFEN 200 MG
1 TABLET ORAL DAILY
Qty: 30 PATCH | Refills: 0 | Status: SHIPPED | OUTPATIENT
Start: 2022-11-21 | End: 2022-12-21

## 2022-11-21 RX ORDER — BACLOFEN 5 MG/1
5 TABLET ORAL 3 TIMES DAILY
Qty: 90 TABLET | Refills: 0 | Status: SHIPPED | OUTPATIENT
Start: 2022-11-21 | End: 2022-12-21

## 2022-11-21 RX ORDER — ARIPIPRAZOLE 20 MG/1
20 TABLET ORAL DAILY
Qty: 30 TABLET | Refills: 0 | Status: SHIPPED | OUTPATIENT
Start: 2022-11-21 | End: 2022-12-21

## 2022-11-21 RX ADMIN — BACLOFEN 5 MG: 10 TABLET ORAL at 09:46

## 2022-11-21 RX ADMIN — LITHIUM CARBONATE 300 MG: 300 TABLET ORAL at 09:46

## 2022-11-21 RX ADMIN — ARIPIPRAZOLE 20 MG: 10 TABLET ORAL at 09:46

## 2022-11-21 NOTE — PROGRESS NOTES
Laboratory Monitoring for Lithium: This patient is currently prescribed the following medication(s):   Current Facility-Administered Medications   Medication Dose Route Frequency    baclofen (LIORESAL) tablet 5 mg  5 mg Oral TID    ARIPiprazole (ABILIFY) tablet 20 mg  20 mg Oral DAILY    lithium carbonate tablet 300 mg  300 mg Oral BID    nicotine (NICODERM CQ) 21 mg/24 hr patch 1 Patch  1 Patch TransDERmal DAILY     The following labs have been completed for monitoring of lithium:    Lithium Serum Concentration  Lab Results   Component Value Date/Time    Lithium level 0.44 (L) 11/19/2022 06:20 AM     Renal Function and Chemistry  Lab Results   Component Value Date/Time    Sodium 139 11/14/2022 03:42 PM    Potassium 3.6 11/14/2022 03:42 PM    Chloride 105 11/14/2022 03:42 PM    CO2 28 11/14/2022 03:42 PM    Anion gap 6 11/14/2022 03:42 PM    BUN 12 11/14/2022 03:42 PM    Creatinine 1.19 11/14/2022 03:42 PM    BUN/Creatinine ratio 10 (L) 11/14/2022 03:42 PM     Assessment/Plan:  A lithium level was drawn on 11/19 @ 0620 and found to be 0.44 mmol/L. This level was drawn ~9 hr following the previous dose and is reflective of steady state trough (after @ least 4 days of therapy). Therapeutic lithium level is generally 0.8-1.2mmol/L (however, some patients may have a target level lower than 0.8mmol/L). This level is considered subtherapeutic but the patient is clinically stable. Of note, the patient has an essential tremor (visible in head and hands) but he reports that he has had this for years.      Margret To, PharmD, BCPP, [facility], [address] [place] [address]

## 2022-11-21 NOTE — PROGRESS NOTES
Pharmacist Discharge Medication Reconciliation    Discharging Provider: Dr. Paulino Bush PMH:   Past Medical History:   Diagnosis Date    Psychiatric disorder     phobias     Chief Complaint for this Admission: No chief complaint on file. Allergies: Tetracycline    Discharge Medications:   Current Discharge Medication List        START taking these medications    Details   lithium carbonate 300 mg tablet Take 1 Tablet by mouth two (2) times a day for 30 days. Indications: bipolar d/o  Qty: 60 Tablet, Refills: 0  Start date: 11/21/2022, End date: 12/21/2022      baclofen 5 mg tab Take 5 mg by mouth three (3) times daily for 30 days. Indications: muscle spasms caused by a spinal disease  Qty: 90 Tablet, Refills: 0  Start date: 11/21/2022, End date: 12/21/2022      nicotine (NICODERM CQ) 21 mg/24 hr 1 Patch by TransDERmal route daily for 30 days. Indications: stop smoking  Qty: 30 Patch, Refills: 0  Start date: 11/21/2022, End date: 12/21/2022           CONTINUE these medications which have CHANGED    Details   ARIPiprazole (ABILIFY) 20 mg tablet Take 1 Tablet by mouth daily for 30 days.  Indications: bipolar d/o  Qty: 30 Tablet, Refills: 0  Start date: 11/21/2022, End date: 12/21/2022           STOP taking these medications       lithium carbonate 300 mg capsule Comments:   Reason for Stopping:             The patient's chart, MAR and AVS were reviewed by Everardo Rivera, ZHANGD.

## 2022-11-21 NOTE — PROGRESS NOTES
Problem: Depressed Mood (Adult/Pediatric)  Goal: *STG: Participates in treatment plan  Outcome: Progressing Towards Goal  Note: Thoughts organized, mood stable and hopeful. Denies SI, no self harming behaviors. Verbalized  understanding of medications and d/c plan.  Staff focus is on offering support and assist w coordination of d/c and follow up  Goal: *STG: Demonstrates reduction in symptoms and increase in insight into coping skills/future focused  Outcome: Progressing Towards Goal  Goal: Interventions  Outcome: Progressing Towards Goal

## 2022-11-21 NOTE — PROGRESS NOTES
Problem: Falls - Risk of  Goal: *Absence of Falls  Description: Document Clay Aaronjose albertojohanna Fall Risk and appropriate interventions in the flowsheet. Outcome: Progressing Towards Goal  Note: Fall Risk Interventions:            Medication Interventions: Teach patient to arise slowly    Elimination Interventions: Toilet paper/wipes in reach    Resting in bed with eyes closed, no complaints, no distress noted. Safety measures in place, will continue to monitor.
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
## 2022-11-21 NOTE — INTERDISCIPLINARY ROUNDS
Behavioral Health Interdisciplinary Rounds     Patient Name: Carlena Goodpasture  Age: 38 y.o. Room/Bed:  [facility]/  Primary Diagnosis: <principal problem not specified>   Admission Status: Voluntary     Readmission within 30 days: no  Power of  in place: no  Patient requires a blocked bed: no          Reason for blocked bed:   Sleep hours:  7      Participation in Care/Groups:    Medication Compliant?: Yes  PRNS (last 24 hours): Pain    Restraints (last 24 hours):  no     Alcohol screening (AUDIT) completed -     If applicable, date SBIRT discussed in treatment team AND documented:    Tobacco - patient is a smoker: Have You Used Tobacco in the Past 30 Days: Yes  Illegal Drugs use: Have You Used Any Illegal Substances Over the Past 12 Months: No    24 hour chart check complete: yes     _______________________________________________    Patient goal(s) for today:   Treatment team focus/goals: Plan for discharge today  Progress note: He denies any issues with his medications or treatment. Denies SI, HI or AVH.     Sleeping well         Financial concerns/prescription coverage:  medicare   Family contact:                        Family requesting physician contact today:    Discharge plan: Plan for discharge today  Access to weapons :  no                                                            Outpatient provider(s):[facility]   Patient's preferred phone number for follow up call :   Patient's preferred e-mail address :  Participating treatment team members:    LOS:  7  Expected LOS: today     Participating treatment team members: Angel Luisflor EricGarrick low Dr., MSW - Marlow Arnold
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
## 2023-02-25 ENCOUNTER — HOSPITAL ENCOUNTER (EMERGENCY)
Age: 43
Discharge: HOME | End: 2023-02-25
Attending: STUDENT IN AN ORGANIZED HEALTH CARE EDUCATION/TRAINING PROGRAM
Payer: MEDICARE

## 2023-02-25 ENCOUNTER — HOSPITAL ENCOUNTER (INPATIENT)
Age: 43
LOS: 10 days | Discharge: HOME | DRG: 885 | End: 2023-03-07
Attending: PSYCHIATRY & NEUROLOGY | Admitting: PSYCHIATRY & NEUROLOGY
Payer: MEDICARE

## 2023-02-25 VITALS
HEART RATE: 70 BPM | HEIGHT: 70 IN | RESPIRATION RATE: 16 BRPM | DIASTOLIC BLOOD PRESSURE: 86 MMHG | BODY MASS INDEX: 23.92 KG/M2 | TEMPERATURE: 97.7 F | OXYGEN SATURATION: 100 % | WEIGHT: 167.11 LBS | SYSTOLIC BLOOD PRESSURE: 121 MMHG

## 2023-02-25 DIAGNOSIS — R45.851 SUICIDAL IDEATIONS: Primary | ICD-10-CM

## 2023-02-25 DIAGNOSIS — F31.4 SEVERE DEPRESSED BIPOLAR I DISORDER WITHOUT PSYCHOTIC FEATURES (HCC): Primary | ICD-10-CM

## 2023-02-25 DIAGNOSIS — M54.41 CHRONIC LOW BACK PAIN WITH RIGHT-SIDED SCIATICA, UNSPECIFIED BACK PAIN LATERALITY: ICD-10-CM

## 2023-02-25 DIAGNOSIS — G89.29 CHRONIC LOW BACK PAIN WITH RIGHT-SIDED SCIATICA, UNSPECIFIED BACK PAIN LATERALITY: ICD-10-CM

## 2023-02-25 PROBLEM — F31.9 BIPOLAR DISORDER (HCC): Status: ACTIVE | Noted: 2023-02-25

## 2023-02-25 LAB
ALBUMIN SERPL-MCNC: 3.6 G/DL (ref 3.5–5)
ALBUMIN/GLOB SERPL: 1.2 (ref 1.1–2.2)
ALP SERPL-CCNC: 73 U/L (ref 45–117)
ALT SERPL-CCNC: 17 U/L (ref 12–78)
AMPHET UR QL SCN: NEGATIVE
ANION GAP SERPL CALC-SCNC: 2 MMOL/L (ref 5–15)
APAP SERPL-MCNC: <2 UG/ML (ref 10–30)
AST SERPL-CCNC: 8 U/L (ref 15–37)
BARBITURATES UR QL SCN: NEGATIVE
BASOPHILS # BLD: 0.1 K/UL (ref 0–0.1)
BASOPHILS NFR BLD: 1 % (ref 0–1)
BENZODIAZ UR QL: NEGATIVE
BILIRUB SERPL-MCNC: 0.5 MG/DL (ref 0.2–1)
BUN SERPL-MCNC: 14 MG/DL (ref 6–20)
BUN/CREAT SERPL: 14 (ref 12–20)
CALCIUM SERPL-MCNC: 8.8 MG/DL (ref 8.5–10.1)
CANNABINOIDS UR QL SCN: NEGATIVE
CHLORIDE SERPL-SCNC: 107 MMOL/L (ref 97–108)
CO2 SERPL-SCNC: 30 MMOL/L (ref 21–32)
COCAINE UR QL SCN: NEGATIVE
COMMENT, HOLDF: NORMAL
CREAT SERPL-MCNC: 0.99 MG/DL (ref 0.7–1.3)
DATE LAST DOSE: ABNORMAL
DIFFERENTIAL METHOD BLD: ABNORMAL
DRUG SCRN COMMENT,DRGCM: NORMAL
EOSINOPHIL # BLD: 0.1 K/UL (ref 0–0.4)
EOSINOPHIL NFR BLD: 2 % (ref 0–7)
ERYTHROCYTE [DISTWIDTH] IN BLOOD BY AUTOMATED COUNT: 12 % (ref 11.5–14.5)
ETHANOL SERPL-MCNC: <10 MG/DL
FLUAV RNA SPEC QL NAA+PROBE: NOT DETECTED
FLUBV RNA SPEC QL NAA+PROBE: NOT DETECTED
GLOBULIN SER CALC-MCNC: 2.9 G/DL (ref 2–4)
GLUCOSE SERPL-MCNC: 98 MG/DL (ref 65–100)
HCT VFR BLD AUTO: 39.6 % (ref 36.6–50.3)
HGB BLD-MCNC: 13 G/DL (ref 12.1–17)
IMM GRANULOCYTES # BLD AUTO: 0 K/UL (ref 0–0.04)
IMM GRANULOCYTES NFR BLD AUTO: 0 % (ref 0–0.5)
LITHIUM SERPL-SCNC: 0.29 MMOL/L (ref 0.6–1.2)
LYMPHOCYTES # BLD: 1.7 K/UL (ref 0.8–3.5)
LYMPHOCYTES NFR BLD: 21 % (ref 12–49)
MCH RBC QN AUTO: 32.1 PG (ref 26–34)
MCHC RBC AUTO-ENTMCNC: 32.8 G/DL (ref 30–36.5)
MCV RBC AUTO: 97.8 FL (ref 80–99)
METHADONE UR QL: NEGATIVE
MONOCYTES # BLD: 0.5 K/UL (ref 0–1)
MONOCYTES NFR BLD: 7 % (ref 5–13)
NEUTS SEG # BLD: 5.8 K/UL (ref 1.8–8)
NEUTS SEG NFR BLD: 69 % (ref 32–75)
NRBC # BLD: 0 K/UL (ref 0–0.01)
NRBC BLD-RTO: 0 PER 100 WBC
OPIATES UR QL: NEGATIVE
PCP UR QL: NEGATIVE
PLATELET # BLD AUTO: 201 K/UL (ref 150–400)
PMV BLD AUTO: 10.6 FL (ref 8.9–12.9)
POTASSIUM SERPL-SCNC: 3.9 MMOL/L (ref 3.5–5.1)
PROT SERPL-MCNC: 6.5 G/DL (ref 6.4–8.2)
RBC # BLD AUTO: 4.05 M/UL (ref 4.1–5.7)
REPORTED DOSE,DOSE: ABNORMAL UNITS
REPORTED DOSE/TIME,TMG: ABNORMAL
SALICYLATES SERPL-MCNC: <1.7 MG/DL (ref 2.8–20)
SAMPLES BEING HELD,HOLD: NORMAL
SARS-COV-2 RNA RESP QL NAA+PROBE: NOT DETECTED
SODIUM SERPL-SCNC: 139 MMOL/L (ref 136–145)
UR CULT HOLD, URHOLD: NORMAL
WBC # BLD AUTO: 8.2 K/UL (ref 4.1–11.1)

## 2023-02-25 PROCEDURE — 65220000003 HC RM SEMIPRIVATE PSYCH

## 2023-02-25 PROCEDURE — 36415 COLL VENOUS BLD VENIPUNCTURE: CPT

## 2023-02-25 PROCEDURE — 85025 COMPLETE CBC W/AUTO DIFF WBC: CPT

## 2023-02-25 PROCEDURE — 80307 DRUG TEST PRSMV CHEM ANLYZR: CPT

## 2023-02-25 PROCEDURE — 82077 ASSAY SPEC XCP UR&BREATH IA: CPT

## 2023-02-25 PROCEDURE — 80143 DRUG ASSAY ACETAMINOPHEN: CPT

## 2023-02-25 PROCEDURE — 87636 SARSCOV2 & INF A&B AMP PRB: CPT

## 2023-02-25 PROCEDURE — 80178 ASSAY OF LITHIUM: CPT

## 2023-02-25 PROCEDURE — 74011250637 HC RX REV CODE- 250/637: Performed by: STUDENT IN AN ORGANIZED HEALTH CARE EDUCATION/TRAINING PROGRAM

## 2023-02-25 PROCEDURE — 80053 COMPREHEN METABOLIC PANEL: CPT

## 2023-02-25 PROCEDURE — 99285 EMERGENCY DEPT VISIT HI MDM: CPT

## 2023-02-25 PROCEDURE — 74011250637 HC RX REV CODE- 250/637: Performed by: NURSE PRACTITIONER

## 2023-02-25 PROCEDURE — 90791 PSYCH DIAGNOSTIC EVALUATION: CPT

## 2023-02-25 PROCEDURE — 80179 DRUG ASSAY SALICYLATE: CPT

## 2023-02-25 RX ORDER — IBUPROFEN 200 MG
1 TABLET ORAL
Status: DISCONTINUED | OUTPATIENT
Start: 2023-02-25 | End: 2023-02-27

## 2023-02-25 RX ORDER — IBUPROFEN 400 MG/1
400 TABLET ORAL
Status: DISCONTINUED | OUTPATIENT
Start: 2023-02-25 | End: 2023-02-26

## 2023-02-25 RX ORDER — ARIPIPRAZOLE 20 MG/1
20 TABLET ORAL DAILY
COMMUNITY
End: 2023-03-07

## 2023-02-25 RX ORDER — TRAZODONE HYDROCHLORIDE 50 MG/1
50 TABLET ORAL
Status: DISCONTINUED | OUTPATIENT
Start: 2023-02-25 | End: 2023-03-07 | Stop reason: HOSPADM

## 2023-02-25 RX ORDER — ACETAMINOPHEN 325 MG/1
975 TABLET ORAL
Status: COMPLETED | OUTPATIENT
Start: 2023-02-25 | End: 2023-02-25

## 2023-02-25 RX ORDER — LORAZEPAM 1 MG/1
1 TABLET ORAL
Status: DISCONTINUED | OUTPATIENT
Start: 2023-02-25 | End: 2023-02-27

## 2023-02-25 RX ORDER — LITHIUM CARBONATE 600 MG/1
600 CAPSULE ORAL DAILY
COMMUNITY
End: 2023-03-07

## 2023-02-25 RX ORDER — IBUPROFEN 600 MG/1
600 TABLET ORAL
Status: COMPLETED | OUTPATIENT
Start: 2023-02-25 | End: 2023-02-25

## 2023-02-25 RX ORDER — ACETAMINOPHEN 325 MG/1
650 TABLET ORAL
Status: DISCONTINUED | OUTPATIENT
Start: 2023-02-25 | End: 2023-03-07 | Stop reason: HOSPADM

## 2023-02-25 RX ORDER — IBUPROFEN 200 MG
1 TABLET ORAL DAILY
Status: DISCONTINUED | OUTPATIENT
Start: 2023-02-25 | End: 2023-02-25 | Stop reason: HOSPADM

## 2023-02-25 RX ORDER — HYDROXYZINE PAMOATE 50 MG/1
50 CAPSULE ORAL
Status: DISCONTINUED | OUTPATIENT
Start: 2023-02-25 | End: 2023-03-07 | Stop reason: HOSPADM

## 2023-02-25 RX ORDER — ADHESIVE BANDAGE
30 BANDAGE TOPICAL DAILY PRN
Status: DISCONTINUED | OUTPATIENT
Start: 2023-02-25 | End: 2023-03-07 | Stop reason: HOSPADM

## 2023-02-25 RX ORDER — MAG HYDROX/ALUMINUM HYD/SIMETH 200-200-20
30 SUSPENSION, ORAL (FINAL DOSE FORM) ORAL
Status: DISCONTINUED | OUTPATIENT
Start: 2023-02-25 | End: 2023-03-07 | Stop reason: HOSPADM

## 2023-02-25 RX ORDER — IBUPROFEN 200 MG
1 TABLET ORAL DAILY
Status: DISCONTINUED | OUTPATIENT
Start: 2023-02-26 | End: 2023-02-25

## 2023-02-25 RX ADMIN — ACETAMINOPHEN 325MG 975 MG: 325 TABLET ORAL at 15:05

## 2023-02-25 RX ADMIN — ACETAMINOPHEN 650 MG: 325 TABLET, FILM COATED ORAL at 18:39

## 2023-02-25 RX ADMIN — IBUPROFEN 600 MG: 600 TABLET, FILM COATED ORAL at 10:10

## 2023-02-25 NOTE — ED PROVIDER NOTES
EMERGENCY DEPARTMENT HISTORY AND PHYSICAL EXAM      Date: 2/25/2023  Patient Name: Douglas Alicea    History of Presenting Illness     Chief Complaint   Patient presents with    Suicidal    Back Pain     Sciatica x 2 years. Pt states he's having suicidal thoughts and his plan is to overdose on his current prescription medications. Pt is alert and oriented x 4. Pt states back pain is 7-8/10. History Provided By: Patient    HPI: Chanel Chapa, 38 y.o. male with a past medical history significant for medical problems as stated below presents to the ED with cc of suicidal ideations. He reports that he plans to kill himself by overdosing on medications. He reports that he has not actually done this though. He has no homicidal ideations, no auditory hallucinations, no visual hallucinations. He reports that he has been hospitalized for symptoms similar to this previously. He also reports that he has chronic back pain that has been bothering him more recently. He reports that his chronic back pain makes it difficult to move his leg completely. There is been no change in his range of motion, but this continues to bother him. He has not been taking medications for the pain. PCP: None    No current facility-administered medications on file prior to encounter. No current outpatient medications on file prior to encounter. Past History     Past Medical History:  Past Medical History:   Diagnosis Date    Psychiatric disorder     phobias     Past Surgical History:  No past surgical history on file. Family History:  Family History   Problem Relation Age of Onset    No Known Problems Mother     No Known Problems Father      Social History:  Social History     Tobacco Use    Smoking status: Never   Vaping Use    Vaping Use: Never used   Substance Use Topics    Alcohol use: Yes     Comment: socially    Drug use: Never     Allergies:   Allergies   Allergen Reactions    Tetracycline Unknown (comments) Felt strange     Review of Systems   Review of Systems  Per HPI    Physical Exam   Physical Exam  Vital signs reviewed and documented in EMR  Nontoxic and well-appearing  No acute distress  No tachycardia  Abdomen nondistended  No focal motor deficits  Sensation intact throughout  Strength 5/5 throughout  Pain in buttocks with passive ROM of right lower extremity but straight leg raise negative  Sensation intact throughout    Diagnostic Study Results     Labs -     Recent Results (from the past 24 hour(s))   ACETAMINOPHEN    Collection Time: 02/25/23  9:54 AM   Result Value Ref Range    Acetaminophen level <2 (L) 10 - 30 ug/mL   ETHYL ALCOHOL    Collection Time: 02/25/23  9:54 AM   Result Value Ref Range    ALCOHOL(ETHYL),SERUM <10 <10 MG/DL   CBC WITH AUTOMATED DIFF    Collection Time: 02/25/23  9:54 AM   Result Value Ref Range    WBC 8.2 4.1 - 11.1 K/uL    RBC 4.05 (L) 4.10 - 5.70 M/uL    HGB 13.0 12.1 - 17.0 g/dL    HCT 39.6 36.6 - 50.3 %    MCV 97.8 80.0 - 99.0 FL    MCH 32.1 26.0 - 34.0 PG    MCHC 32.8 30.0 - 36.5 g/dL    RDW 12.0 11.5 - 14.5 %    PLATELET 491 660 - 288 K/uL    MPV 10.6 8.9 - 12.9 FL    NRBC 0.0 0  WBC    ABSOLUTE NRBC 0.00 0.00 - 0.01 K/uL    NEUTROPHILS 69 32 - 75 %    LYMPHOCYTES 21 12 - 49 %    MONOCYTES 7 5 - 13 %    EOSINOPHILS 2 0 - 7 %    BASOPHILS 1 0 - 1 %    IMMATURE GRANULOCYTES 0 0.0 - 0.5 %    ABS. NEUTROPHILS 5.8 1.8 - 8.0 K/UL    ABS. LYMPHOCYTES 1.7 0.8 - 3.5 K/UL    ABS. MONOCYTES 0.5 0.0 - 1.0 K/UL    ABS. EOSINOPHILS 0.1 0.0 - 0.4 K/UL    ABS. BASOPHILS 0.1 0.0 - 0.1 K/UL    ABS. IMM.  GRANS. 0.0 0.00 - 0.04 K/UL    DF AUTOMATED     METABOLIC PANEL, COMPREHENSIVE    Collection Time: 02/25/23  9:54 AM   Result Value Ref Range    Sodium 139 136 - 145 mmol/L    Potassium 3.9 3.5 - 5.1 mmol/L    Chloride 107 97 - 108 mmol/L    CO2 30 21 - 32 mmol/L    Anion gap 2 (L) 5 - 15 mmol/L    Glucose 98 65 - 100 mg/dL    BUN 14 6 - 20 MG/DL    Creatinine 0.99 0.70 - 1.30 MG/DL BUN/Creatinine ratio 14 12 - 20      eGFR >60 >60 ml/min/1.73m2    Calcium 8.8 8.5 - 10.1 MG/DL    Bilirubin, total 0.5 0.2 - 1.0 MG/DL    ALT (SGPT) 17 12 - 78 U/L    AST (SGOT) 8 (L) 15 - 37 U/L    Alk. phosphatase 73 45 - 117 U/L    Protein, total 6.5 6.4 - 8.2 g/dL    Albumin 3.6 3.5 - 5.0 g/dL    Globulin 2.9 2.0 - 4.0 g/dL    A-G Ratio 1.2 1.1 - 2.2     SALICYLATE    Collection Time: 02/25/23  9:54 AM   Result Value Ref Range    Salicylate level <5.5 (L) 2.8 - 20.0 MG/DL   SAMPLES BEING HELD    Collection Time: 02/25/23  9:55 AM   Result Value Ref Range    SAMPLES BEING HELD BLUE, LAV, RED, 2 PST, 2 YELLOW     COMMENT        Add-on orders for these samples will be processed based on acceptable specimen integrity and analyte stability, which may vary by analyte. URINE CULTURE HOLD SAMPLE    Collection Time: 02/25/23  9:55 AM    Specimen: Urine   Result Value Ref Range    Urine culture hold        Urine on hold in Microbiology dept for 2 days. If unpreserved urine is submitted, it cannot be used for addtional testing after 24 hours, recollection will be required. DRUG SCREEN, URINE    Collection Time: 02/25/23  9:55 AM   Result Value Ref Range    AMPHETAMINES Negative NEG      BARBITURATES Negative NEG      BENZODIAZEPINES Negative NEG      COCAINE Negative NEG      METHADONE Negative NEG      OPIATES Negative NEG      PCP(PHENCYCLIDINE) Negative NEG      THC (TH-CANNABINOL) Negative NEG      Drug screen comment (NOTE)    COVID-19 WITH INFLUENZA A/B    Collection Time: 02/25/23 11:35 AM   Result Value Ref Range    SARS-CoV-2 by PCR Not detected NOTD      Influenza A by PCR Not detected NOTD      Influenza B by PCR Not detected NOTD       Radiologic Studies -   No orders to display     CT Results  (Last 48 hours)      None          CXR Results  (Last 48 hours)      None            Medical Decision Making   I am the first provider for this patient.     I reviewed the vital signs, available nursing notes, past medical history, past surgical history, family history and social history. Vital Signs-Reviewed the patient's vital signs. Patient Vitals for the past 12 hrs:   Temp Pulse Resp BP SpO2   02/25/23 0934 97.7 °F (36.5 °C) 70 16 121/86 100 %     Records Reviewed: Nursing records and medical records reviewed    Medical Decision Making  Patient is a 76-year-old male with a history of type I bipolar disorder presents the emergency department with complaints of suicidal ideations. He reports that he has a plan to commit suicide by overdosing on his medications. He has not tried to attempt suicide. We will consult be smart with psychiatry for plans for further management. He has never altered mental status. He denies any homicidal ideations. He denies any auditory visual hallucinations. Will consult BSMART and reassess following their consultation, anticipate that they will recommend admission for patient. Will give patient PO ibuprofen for chronic sciatica, but do not think he has any acute process requiring further imaging. NO numbness, tingling, weakness to suggest cord compression. Amount and/or Complexity of Data Reviewed  Labs: ordered. Risk  OTC drugs. Prescription drug management. ED Course:   Initial assessment performed. The patients presenting problems have been discussed, and they are in agreement with the care plan formulated and outlined with them. I have encouraged them to ask questions as they arise throughout their visit. ED Course as of 02/25/23 1313   Sat Feb 25, 2023   1023 BSMART to evaluate the patient. [WB]   1117 BSMART recommending psychiatric admission. Will obtain labs for medical clearance. [WB]   1312 Labs unremarkable. Patient is medically cleared.   Nicotine patch ordered for patient. [WB]      ED Course User Index  [WB] Kirstin Lo MD       Medications Administered       ibuprofen (MOTRIN) tablet 600 mg       Admin Date  02/25/2023 Action  Given Dose  600 mg Route  Oral Administered By  Roseann Corral RN                  Critical Care:  None    Disposition:  Admission    Diagnosis     Clinical Impression:   1. Suicidal ideations    2. Chronic low back pain with right-sided sciatica, unspecified back pain laterality      Attestations:    Graciela Vargas MD    Please note that this dictation was completed with [employer]vance, the computer voice recognition software. Quite often unanticipated grammatical, syntax, homophones, and other interpretive errors are inadvertently transcribed by the computer software. Please disregard these errors. Please excuse any errors that have escaped final proofreading. Thank you.

## 2023-02-25 NOTE — BH NOTES
Report received from Verner Handing, RN at [facility], results of labs. Adequate time given for questions. Admitted via  WC  to unit as voluntary pt at [phone] accompanied by security. .  Alert and oriented x  4. Tremulous. Affect blunted; Mood anxious/depressed. Smoking and alcohol  danger situations, triggers, coping mechanisms, resources discussed and Nicotine patch offered. Does not hold a professional license in [address] [facility] Ramon [place] NP notified of arrival, along with RNS. Treatment will focus on mary for safety, medication and group activities to better manage anxiety/depression.     PRN Medication Documentation    Specific patient behavior that led to need for PRN medication: 3/10 sciatic pain  Staff interventions attempted prior to PRN being given: assess  PRN medication given: Tylenol 650mg po given at [facility]  Patient response/effectiveness of PRN medication: 1/10

## 2023-02-25 NOTE — BSMART NOTE
Comprehensive Assessment Form Part 1      Section I - Disposition    Axis I - Bipolar do, current episode depressed with no psychosis      Chronic and debilitating sciatica pain     Bipolar d/o by hx    Noncompliant with Rx meds by hx      The Medical Doctor to Psychiatrist conference was not completed. Medical doctor is in agreement with this counselor's assessment and plan of care. The plan is to admit to psych. The ED provider consulted was Dr. Bruce Rasheed.  The admitting Psychiatrist will be Dr. Jerald Walls. The admitting Diagnosis is Bipolar do, current episode depressed. The Payor source is [place] MEDICARE - [place] MEDICARE PART A & B. [place] Suicide Scale - This writer reviewed the [place] Suicide Severity Rating Scale in nursing flow sheet and the risk level assigned is high. Based on this assessment, the risk of suicide is high and the plan is to admit pending medical clearance. Section II - Integrated Summary  Summary:  Per triage/provider:  Patient is a 39 yo male who arrives at ED via EMS with chief complaint of sciatica pain x 2 years. Pt states he's having suicidal thoughts and his plan is to overdose on his current prescription medications. Pt is alert and oriented x 4. Pt states back pain is 7-8/10. Patient presents depressed and despondent with flat affect. This writer assessed patient on 11/14/22 when he presented with similar problem and presentation. Today, patent reports increasing depression that \"is worse than last time. \" He cannot identify any triggers or stressors that precipitate current depression. Patient states he is extremely depressed and hopeless. He reports hypersomnia and does not participate in any activities outside his home saying,\"I usually just stay in my room and sleep most of the day and night. \" He receives SSDI and does not appear to be motivated to engage in any day treatment programs or other recreational activities.  He continues to endorse SI with plan to overdose on Rx medications. He reports no previous suicide attempts. He lives with his grandmother/Iris in [facility] but is followed by [facility] for psychiatric care. He receives counseling services from [facility]. Patient is prescribed Abilify 25mg and Lithium 600mg and reports compliance with meds. Patient says he is in the process of transferring psychiatric services to [address] [place] [address]. His next appointment in [facility] is May 3rd. His next counseling appointment with [facility] is March 10th. Patient is well groomed, well nourished, and demonstrates adequate hygiene. He is alert and cooperative. Speech is clear, spontaneous, and understandable with normal rate, rhythm, and soft tone of voice. Mood is depressed and despondent with congruent affect. Thought process is linear, organized, and coherent. Patient's memory appears intact and fund of knowledge is adequate. Patient is oriented X4. Patient denies any recent alcohol or illicit drug use. His ETOH is pending, drug screen is pending, and Covid test is pending. Patient denies homicidal ideation, denies auditory/visual hallucinations, demonstrates no delusional thoughts, and does not appear to be responding to internal stimuli. Patient denies any history of physical aggression or violence. Patient has history of a psych admission to [facility] on 11/14/22 with Bipolar d/o, depression. Patient is agreeable to a psychiatric admission. The patient has demonstrated mental capacity to provide informed consent. The information is given by the patient and past medical records. The Chief Complaint is depressed, SI with plan to OD on Rx meds. The Precipitant Factors are hx of Bipolar. Previous Hospitalizations: [facility] 11/14/22 depression. The patient has not previously been in restraints. Current Psychiatrist and/or  is [facility] for psychiatry and [facility] for therapy.     Lethality Assessment:    The potential for suicide noted by the following: intent, defined plan, ideation, and means . The potential for homicide is not noted. The patient has not been a perpetrator of sexual or physical abuse. There are not pending charges. The patient is felt to be at risk for self harm or harm to others. The attending nurse was advised to remove potentially harmful or dangerous items from the patient's room , to request a search of the patient's belongings, to remove patient clothing and place it out of immediate access to the patient, the patient is at risk for self harm, and the patient needs supervision. Section III - Psychosocial  The patient's overall mood and attitude is depressed and despondent. Feelings of helplessness and hopelessness are observed by affect, demeanor, and self report. Generalized anxiety is not observed. Panic is not observed. Phobias are not observed. Obsessive compulsive tendencies are not observed. Section IV - Mental Status Exam  The patient's appearance shows no evidence of impairment. The patient's behavior shows no evidence of impairment. The patient is oriented to time, place, person and situation. The patient's speech is soft. The patient's mood is depressed, is withdrawn, is sad, and displays anhedonia. The range of affect is flat. The patient's thought content demonstrates no evidence of impairment. The thought process shows no evidence of impairment. The patient's perception shows no evidence of impairment. The patient's memory shows no evidence of impairment. The patient's appetite shows no evidence of impairment. The patient's sleep has evidence of hypersomnia. The patient's insight shows no evidence of impairment. The patient's judgement shows no evidence of impairment. Section V - Substance Abuse  The patient is not using substances. Section VI - Living Arrangements  The patient is . The patient lives with grandmother/Iris The patient has one child age 21.   The patient does plan to return home upon discharge. The patient does not have legal issues pending. The patient's source of income comes from disability and social security. Taoist and cultural practices have not been voiced at this time. The patient's greatest support comes from [facility]  for psychiatry and [facility] for therapy and this person will be involved with the treatment. The patient has not been in an event described as horrible or outside the realm of ordinary life experience either currently or in the past.  The patient has not been a victim of sexual/physical abuse. Section VII - Other Areas of Clinical Concern  The highest grade achieved is 2 years [employer] with the overall quality of school experience being described as good. The patient is currently disabled and speaks [place] as a primary language. The patient has no communication impairments affecting communication. The patient's preference for learning can be described as: can read and write adequately.   The patient's hearing is normal.  The patient's vision is normal.      Carlyle Howell, SPEEDY

## 2023-02-25 NOTE — PROGRESS NOTES
BSMART assessment completed, and suicide risk level noted to be high. ED Provider/Dr Lo and Nurse/Arturo notified. Concerns not observed. Security/Off- has not been notified.

## 2023-02-25 NOTE — BSMART NOTE
Patient has been accepted to [facility] 230 bed 1 by Dr Kenia Posada. Report can be called at [phone].

## 2023-02-25 NOTE — BSMART NOTE
Bsmart is aware of consult and will assess patient once a previous consult has been completed. ED provider agrees with plan of care.

## 2023-02-25 NOTE — ED TRIAGE NOTES
Sciatica x 2 years. Pt states he's having suicidal thoughts and his plan is to overdose on his current prescription medications. Pt is alert and oriented x 4. Pt states back pain is 7-8/10.

## 2023-02-25 NOTE — MASTER TREATMENT PLAN
[facility] 60  Master Treatment Plan for Maureen Dowling    Date Treatment Plan Initiated: 02/25/2023    Treatment Plan Modalities:  Type of Modality Amount  (x minutes) Frequency (x/week) Duration (x days) Name of Responsible Staff   Community & wrap-up meetings to encourage peer interactions 30 14 1 KAI Reilly [address] [place] [address] psychotherapy to assist in building coping skills and internal controls [facility]., [facility]  Sarrah Soulier., [facility]   Therapeutic activity groups to build coping skills 60 7 1 Fausto Mienr., [facility]     Psychoeducation in group setting to address:   Medication education  Coping Skills  Symptom Management   60 7 1 Richard Clifford., [facility]  Sarrah Soulier., [facility]  Danica Turner, LESLEY Mix RN   Discharge planning   60 2 1 Melania Cervantes.,    Spirituality    60 2 1 Kayla Donato.   Physician medication management   15 7 1 KYLE Ha MD                                         Treatment Team Signatures    I have participated in the development of this plan of treatment and agree to its implementation.     Patient Signature       Patient Printed Name Date/Time   Social Work/Therapist Signature       Social Work/Therapist Printed Name Date/Time   RN Signature       RN Printed Name  Adriane Almaguer RN Date/Time   Other Signature     Other Signature Date/Time   MD Signature       MD Printed Name Date/Time

## 2023-02-26 PROBLEM — F31.4 SEVERE DEPRESSED BIPOLAR I DISORDER WITHOUT PSYCHOTIC FEATURES (HCC): Status: ACTIVE | Noted: 2023-02-25

## 2023-02-26 PROBLEM — F31.30 BIPOLAR DISORDER CURRENT EPISODE DEPRESSED (HCC): Status: RESOLVED | Noted: 2022-11-15 | Resolved: 2023-02-26

## 2023-02-26 PROCEDURE — 74011250637 HC RX REV CODE- 250/637: Performed by: NURSE PRACTITIONER

## 2023-02-26 PROCEDURE — 65220000003 HC RM SEMIPRIVATE PSYCH

## 2023-02-26 PROCEDURE — 74011250637 HC RX REV CODE- 250/637: Performed by: STUDENT IN AN ORGANIZED HEALTH CARE EDUCATION/TRAINING PROGRAM

## 2023-02-26 RX ORDER — LITHIUM CARBONATE 300 MG/1
300 TABLET, FILM COATED, EXTENDED RELEASE ORAL 2 TIMES DAILY
Status: DISCONTINUED | OUTPATIENT
Start: 2023-02-26 | End: 2023-03-03

## 2023-02-26 RX ADMIN — ACETAMINOPHEN 650 MG: 325 TABLET, FILM COATED ORAL at 06:09

## 2023-02-26 RX ADMIN — ACETAMINOPHEN 650 MG: 325 TABLET, FILM COATED ORAL at 21:04

## 2023-02-26 RX ADMIN — HYDROXYZINE PAMOATE 50 MG: 50 CAPSULE ORAL at 06:09

## 2023-02-26 RX ADMIN — ARIPIPRAZOLE 20 MG: 15 TABLET ORAL at 11:13

## 2023-02-26 RX ADMIN — ACETAMINOPHEN 650 MG: 325 TABLET, FILM COATED ORAL at 13:00

## 2023-02-26 RX ADMIN — LITHIUM CARBONATE 300 MG: 300 TABLET, EXTENDED RELEASE ORAL at 18:29

## 2023-02-26 RX ADMIN — LITHIUM CARBONATE 300 MG: 300 TABLET, EXTENDED RELEASE ORAL at 11:13

## 2023-02-26 NOTE — PROGRESS NOTES
02/25/23 [address] meeting   Attendance Attended   Number of participants 4   Time in 2000   Time out 2030   Total Time 30   MTP problem Depression;Pain   Interventions/techniques Supported   Follows directions Followed directions   Interactions Interacted appropriately   Mental Status Calm   Behavior/appearance Cooperative   Suicide Risk Factors No thoughts of harm. Suicide Protective Factors Denies intent   Goals Achieved Able to engage in interactions     He has no anxiety, but has depression at an 8 and sciatic pain at a 5.

## 2023-02-26 NOTE — PROGRESS NOTES
Pt expressed anxiety was at a 3/10 and depression 8/10. Some physical pain in back area. Pt expressed he has suicidal thoughts with no intent. Pt goal is to shower today. 02/26/23 [address] meeting   Attendance Attended   Number of participants 3   Time in 1000   Time out 1030   Total Time 30   Interventions/techniques Informed   Follows directions Followed directions   Interactions Interacted appropriately   Mental Status Anxious   Behavior/appearance Attentive; Cooperative   Suicide Protective Factors No organized plan   Goals Achieved Able to engage in interactions; Able to listen to others; Able to receive feedback; Able to self-disclose; Identified feelings; Identified triggers

## 2023-02-26 NOTE — BH NOTES
Affect flat. Mood anxious/depressed. Alert and oriented x 3. States he has \"an occasional suicidal thought\" but no plan to act on it. Frequently pacing in miller. Wearing Nicotine patch and feels it is helping his anxiety. Educated on med regime. Med compliant and cooperative. Logical thinking. Anhedonic. Did not want to work on solitary activities such as puzzling or reading. Education in handout form given about sciatica yoga exercises. Denies HI/AVH.     PRN Medication Documentation    Specific patient behavior that led to need for PRN medication: 6/10 r leg (sciatic )pain  Staff interventions attempted prior to PRN being given: assess  PRN medication given: Tylenol 650mg po given at 1300  Patient response/effectiveness of PRN medication: 1/10 at  1400

## 2023-02-26 NOTE — H&P
History and Physical    Chief complaint   Suicidal    Back Pain     History of Present Illness    Fredy Abel is a 38 y.o. male with past medical history of Bipolar I disorder and chronic low back pain due to sciatica who is admitted to [facility] for evaluation and management of suicidal ideation. Patient states he was hospitalized in Nov 2022 with similar symptoms and did feel better upon discharge however he started to feel bad and more depressed again over the past month or so. He states he finalized his divorce about a month ago and believes this has contributed to his worsening symptoms. He reports suicidal ideation but denied any attempts. He also has chronic low back pain due to sciatica. He previously received epidural steroid injection with some relief. He was recommended surgery for definitve management. Past Medical History:   Diagnosis Date    Chronic bilateral low back pain     Psychiatric disorder     phobias      History reviewed. No pertinent surgical history. Family History   Problem Relation Age of Onset    No Known Problems Mother     No Known Problems Father       Social History     Tobacco Use    Smoking status: Never    Smokeless tobacco: Not on file   Substance Use Topics    Alcohol use: Yes     Comment: socially       Prior to Admission medications    Medication Sig Start Date End Date Taking? Authorizing Provider   ARIPiprazole (ABILIFY) 20 mg tablet Take 20 mg by mouth daily. Yes Provider, Historical   lithium carbonate 600 mg capsule Take 600 mg by mouth daily. Yes Provider, Historical     Allergies   Allergen Reactions    Tetracycline Unknown (comments)     Felt strange        Review of Systems:  A comprehensive review of systems was negative except for that written in the History of Present Illness. Objective: Intake and Output:    No intake/output data recorded. No intake/output data recorded.     Physical Exam:   General appearance: alert, cooperative, no distress, appears stated age  Eyes: conjunctivae/corneas clear. PERRL  Neck: supple, symmetrical, trachea midline  Lungs: clear to auscultation bilaterally  Heart: regular rate and rhythm, S1, S2 normal, no murmur, click, rub or gallop  Abdomen: soft, non-tender. Bowel sounds normal. No masses,  no organomegaly  Extremities: extremities normal, atraumatic, no cyanosis or edema  Neurologic: Grossly normal  Psychiatry: Calm. Depressed affect. Data Review:   Recent Results (from the past 24 hour(s))   COVID-19 WITH INFLUENZA A/B    Collection Time: 02/25/23 11:35 AM   Result Value Ref Range    SARS-CoV-2 by PCR Not detected NOTD      Influenza A by PCR Not detected NOTD      Influenza B by PCR Not detected NOTD           Assessment:     Primary disorder:    Suicidal ideation    Bipolar disorder ([facility] [address] (2/25/2023)  - Patient is on Abilify and Lithium  - Continue management per inpatient Psychiatry team    Active Problems:    Sciatica  - Continue Tylenol prn. Add Ibuprofen as needed. Patient encouraged on ambulation. Outpatient Neurosurgery follow up for further management. Code status: Full  DVT prophylaxis: Ambulation    Care plan discussed with patient and nursing staff.     Time spent with patient: 40 minutes    Bess Villanueva MD  Hospitalist  02/26/22

## 2023-02-26 NOTE — PROGRESS NOTES
Problem: Depressed Mood (Adult/Pediatric)  Goal: *STG: Attends activities and groups  Outcome: Progressing Towards Goal  Note: Active participant in group therapy. Goal: *STG: Complies with medication therapy  Outcome: Progressing Towards Goal  Note: Med compliant. Problem: Depressed Mood (Adult/Pediatric)  Goal: *STG: Complies with medication therapy  Outcome: Progressing Towards Goal  Note: Med compliant.

## 2023-02-26 NOTE — BH NOTES
Affect constricted/flat, mood depressed/anxious. Alert and Oriented X 4. Cooperative and guarded. Withdrawn and isolative. Attended and participated in group. Interacted with staff and peers when prompted. Pt needs encouragement in participating. Avoids eye contact on at times, speech soft/clear, motor  tremors, and appearance appropriate. Ate  snacks. Denied SI/HI/AVH. [place] Medication compliant. Stable with no s/s of distress. Laid in bed with eyes closed for 8 hours and 15 min.         PRN Medication Documentation    Specific patient behavior that led to need for PRN medication: c/o pain 3/10 back  Staff interventions attempted prior to PRN being given: reposition  PRN medication given: tylenol 650 mg po at 0609  Patient response/effectiveness of PRN medication: tolerated      PRN Medication Documentation    Specific patient behavior that led to need for PRN medication: anxious  Staff interventions attempted prior to PRN being given: calming technique   PRN medication given: vistaril 50 mg po at 0609  Patient response/effectiveness of PRN medication: tolerated

## 2023-02-26 NOTE — H&P
Psychiatry History and Physical    Subjective:     Date of Evaluation:  2/26/2023    History of Presenting Problem: Jermaine Rangel is a 38year old male admitted to the [facility] for increased depression and anxiety. He expresses SI with a plan to OD on medications but no intent to do so. He reports that for the past 1-2 weeks he feels like all he does is sleep an worry. He recently moved to [address] from [place] to live with his grandmother after losing his house when his wife left him. He does not believe that he is able to function well enough to maintain a job and take care of himself. He reports a tremor with Lithium and his level is 0.29, making it unlikely he will tolerate a dosage increase. His last manic episode was in September or October 2022. He is feeling helpless and hopeless, does not feel that he has anything to look forward to. He feels that if he could have a goal to work towards he would be able to feel better. He has been struggling with completing his ADLs over the past several months. He denies any history of medication induced silva. He had been receiving services through the [facility] in [place] but is in the process of transitioning to [address] [place] [address]. He has been set up with a therapist there but is following up with his [place] prescriber until he can be set up with one at [facility]. Patient Active Problem List    Diagnosis Date Noted    Severe depressed bipolar I disorder without psychotic features ([facility] [address] 02/25/2023     Past Medical History:   Diagnosis Date    Chronic bilateral low back pain     Psychiatric disorder     phobias     History reviewed. No pertinent surgical history.     Family History   Problem Relation Age of Onset    No Known Problems Mother     No Known Problems Father       Social History     Tobacco Use    Smoking status: Never    Smokeless tobacco: Not on file   Substance Use Topics    Alcohol use: Yes     Comment: socially     Prior to Admission medications    Medication Sig Start Date End Date Taking? Authorizing Provider   ARIPiprazole (ABILIFY) 20 mg tablet Take 20 mg by mouth daily. Yes Provider, Historical   lithium carbonate 600 mg capsule Take 600 mg by mouth daily.    Yes Provider, Historical     Allergies   Allergen Reactions    Tetracycline Unknown (comments)     Felt strange        Objective:     Patient Vitals for the past 8 hrs:   BP Temp Pulse Resp SpO2   02/26/23 0748 100/66 97.5 °F (36.4 °C) 66 17 98 %       Mental Status exam: WNL except for    Sensorium  oriented to time, place and person   Orientation person, place, time/date, and situation   Relations cooperative   Eye Contact appropriate   Appearance:  age appropriate and within normal Limits   Motor Behavior:  within normal limits   Speech:  normal pitch and normal volume   Vocabulary average   Thought Process: within normal limits   Thought Content not internally preoccupied    Suicidal ideations plan   Homicidal ideations none   Mood:  anxious and depressed   Affect:  constricted   Memory recent  adequate   Memory remote:  adequate   Concentration:  adequate   Abstraction:  abstract   Insight:  fair   Reliability good   Judgment:  fair             Impression:      Principal Problem:    Severe depressed bipolar I disorder without psychotic features ([facility] [address] (2/25/2023)          Plan:     [facility]  Medication management  Dispo planning with social work  Estimated length of stay 5-7 days

## 2023-02-27 PROCEDURE — 74011250637 HC RX REV CODE- 250/637: Performed by: STUDENT IN AN ORGANIZED HEALTH CARE EDUCATION/TRAINING PROGRAM

## 2023-02-27 PROCEDURE — 74011250637 HC RX REV CODE- 250/637: Performed by: PSYCHIATRY & NEUROLOGY

## 2023-02-27 PROCEDURE — 74011000250 HC RX REV CODE- 250: Performed by: STUDENT IN AN ORGANIZED HEALTH CARE EDUCATION/TRAINING PROGRAM

## 2023-02-27 PROCEDURE — 65220000003 HC RM SEMIPRIVATE PSYCH

## 2023-02-27 PROCEDURE — 99232 SBSQ HOSP IP/OBS MODERATE 35: CPT | Performed by: PSYCHIATRY & NEUROLOGY

## 2023-02-27 RX ORDER — OXCARBAZEPINE 300 MG/1
300 TABLET, FILM COATED ORAL 2 TIMES DAILY
Status: DISCONTINUED | OUTPATIENT
Start: 2023-02-27 | End: 2023-03-02

## 2023-02-27 RX ORDER — LORAZEPAM 1 MG/1
1 TABLET ORAL
Status: DISCONTINUED | OUTPATIENT
Start: 2023-02-27 | End: 2023-03-07 | Stop reason: HOSPADM

## 2023-02-27 RX ORDER — DM/P-EPHED/ACETAMINOPH/DOXYLAM 30-7.5/3
2 LIQUID (ML) ORAL
Status: DISCONTINUED | OUTPATIENT
Start: 2023-02-27 | End: 2023-02-28

## 2023-02-27 RX ORDER — LIDOCAINE 4 G/100G
1 PATCH TOPICAL DAILY PRN
Status: DISCONTINUED | OUTPATIENT
Start: 2023-02-27 | End: 2023-03-07 | Stop reason: HOSPADM

## 2023-02-27 RX ORDER — ARIPIPRAZOLE 5 MG/1
10 TABLET ORAL DAILY
Status: DISCONTINUED | OUTPATIENT
Start: 2023-02-27 | End: 2023-03-01

## 2023-02-27 RX ORDER — OXCARBAZEPINE 150 MG/1
150 TABLET, FILM COATED ORAL
Status: COMPLETED | OUTPATIENT
Start: 2023-02-27 | End: 2023-02-27

## 2023-02-27 RX ADMIN — OXCARBAZEPINE 300 MG: 300 TABLET, FILM COATED ORAL at 17:29

## 2023-02-27 RX ADMIN — NICOTINE POLACRILEX 2 MG: 2 LOZENGE ORAL at 15:34

## 2023-02-27 RX ADMIN — HYDROXYZINE PAMOATE 50 MG: 50 CAPSULE ORAL at 15:18

## 2023-02-27 RX ADMIN — NICOTINE POLACRILEX 2 MG: 2 LOZENGE ORAL at 13:30

## 2023-02-27 RX ADMIN — NICOTINE POLACRILEX 2 MG: 2 LOZENGE ORAL at 09:19

## 2023-02-27 RX ADMIN — OXCARBAZEPINE 150 MG: 150 TABLET, FILM COATED ORAL at 09:07

## 2023-02-27 RX ADMIN — ACETAMINOPHEN 650 MG: 325 TABLET, FILM COATED ORAL at 15:18

## 2023-02-27 RX ADMIN — ARIPIPRAZOLE 10 MG: 5 TABLET ORAL at 09:06

## 2023-02-27 RX ADMIN — NICOTINE POLACRILEX 2 MG: 2 LOZENGE ORAL at 17:29

## 2023-02-27 RX ADMIN — ACETAMINOPHEN 650 MG: 325 TABLET, FILM COATED ORAL at 06:48

## 2023-02-27 RX ADMIN — LITHIUM CARBONATE 300 MG: 300 TABLET, EXTENDED RELEASE ORAL at 17:29

## 2023-02-27 RX ADMIN — LITHIUM CARBONATE 300 MG: 300 TABLET, EXTENDED RELEASE ORAL at 09:07

## 2023-02-27 RX ADMIN — NICOTINE POLACRILEX 2 MG: 2 LOZENGE ORAL at 11:13

## 2023-02-27 NOTE — PROGRESS NOTES
INTERVAL Hx:  Records and clinical information were reviewed. States he's still very depressed and that he's still concerned about the vague SI he has, even though those thoughts are ego-dystonic and he has no plans or intent to act on them. He's very anxious at baseline and worries that he'll never get better or remain stable. Discussed his history and the Rx and Tx issues with BPAD. Will continue the low-dose Lithium and use the SR formulation and split the dose to try and reduce the SE's. Will also start Trileptal at 300 mg BID and likely titrate while tapering off the Abilify. Slept 9 hours last night. MEDS:  Current Facility-Administered Medications   Medication Dose Route Frequency    nicotine buccal (POLACRILEX) lozenge 2 mg  2 mg Oral Q2H PRN    ARIPiprazole (ABILIFY) tablet 10 mg  10 mg Oral DAILY    OXcarbazepine (TRILEPTAL) tablet 300 mg  300 mg Oral BID    LORazepam (ATIVAN) tablet 1 mg  1 mg Oral Q6H PRN    lithium carbonate SR (LITHOBID) tablet 300 mg  300 mg Oral BID    magnesium hydroxide (MILK OF MAGNESIA) 400 mg/5 mL oral suspension 30 mL  30 mL Oral DAILY PRN    alum-mag hydroxide-simeth (MYLANTA) oral suspension 30 mL  30 mL Oral Q4H PRN    acetaminophen (TYLENOL) tablet 650 mg  650 mg Oral Q4H PRN    traZODone (DESYREL) tablet 50 mg  50 mg Oral QHS PRN    hydrOXYzine pamoate (VISTARIL) capsule 50 mg  50 mg Oral QID PRN    OLANZapine (ZyPREXA) 10 mg in sterile water (preservative free) 2 mL injection  10 mg IntraMUSCular BID PRN       VITALS: Patient Vitals for the past 12 hrs:   Temp Pulse Resp BP SpO2   02/27/23 0748 97.7 °F (36.5 °C) 71 17 109/63 98 %       LABS: .No results found for this or any previous visit (from the past 24 hour(s)).     MSE:   Appearance: casually dressed; adequately groomed  Behavior: calm and cooperative; no agitation  Motor: normal  Mood/Affect: dysphoric; slightly restricted  Thought Process: coherent; organized  Thought Content: no delusions; no SI/HI  Perceptions: no hallucinations  Insight/Judgment: fair    ASSESSMENT:   1. Bipolar disorder, depressed, severe (F31.4)    PLAN:  1. Continue the LiSR at 300 mg BID. 2.  Taper Abilify to 10 mg daily and plan to stop it soon. 3.  Start Trileptal at 150 mg now, and then 300 mg BID--likely increase soon. 4.  Continue the PRN Trazodone and Vistaril. 5.  ELOS: 6-7 days. I certify that the inpatient psychiatric hospital services furnished since the previous certification/re-certification were, and continue to be, medically necessary for treatment which could reasonably be expected to improve the patient's condition and/or for diagnostic study. This patient continues to need, on a daily basis, active treatment furnished directly by or under the supervision of inpatient psychiatric personnel.

## 2023-02-27 NOTE — BH NOTES
Behavioral Health Interdisciplinary Rounds     Patient Name: Elvia Gallardo  Age: 38 y.o. Room/Bed:  230/01  Primary Diagnosis: Severe depressed bipolar I disorder without psychotic features ([facility] [address]   Admission Status: Voluntary     Readmission within 30 days: no  Power of  in place: no  Patient requires a blocked bed: no          Reason for blocked bed:     VTE Prophylaxis: Not indicated    Mobility needs/Fall risk: yes  Flu Vaccine : no   Nutritional Plan: no  Consults: no         Labs/Testing due today?: no    Sleep hours: 9       Participation in Care/Groups:  yes  Medication Compliant?: Yes  PRNS (last 24 hours): Antianxiety and Pain    Restraints (last 24 hours):  no     CIWA (range last 24 hours):     COWS (range last 24 hours):      Alcohol screening (AUDIT) completed -   AUDIT Score: 0     If applicable, date SBIRT discussed in treatment team AND documented:   AUDIT Screen Score: AUDIT Score: 0      Document Brief Intervention (corresponds directly with the 5 A's, Ask, Advise, Assess, Assist, and Arrange):  n/a    At- Risk Patients (Score 7-15 for women; 8-15 for men)  Discuss concern patient is drinking at unhealthy levels known to increase risk of alcohol-related health problems. Is Patient ready to commit to change? N/a    If No:  Encourage reflection  Discuss short term and long term health risks of consuming alcohol  Barriers to change  Reaffirm willingness to help / Educational materials provided  If Yes:  Set goal  Plan  Educational materials provided    Harmful use or Dependence (Score 16 or greater)  Discuss short term and long term health risks of consuming alcohol  Recommendations  Negotiate drinking goal  Recommend addiction specialist/center  Arrange follow-up appointments.     Tobacco - patient is a smoker: Have You Used Tobacco in the Past 30 Days: Yes (chews tobacco)  Illegal Drugs use: Have You Used Any Illegal Substances Over the Past 12 Months: No    24 hour chart check complete: yes     Patient goal(s) for today: to feel better and get the help  Treatment team focus/goals: Remains safe in hospital  Progress note continues to be depressed, has vague SI, no plans or intent. He is very anxious.  His medications will be transitioning from Abilify to Trileptal    LOS:  2  Expected LOS: 6-7    Financial concerns/prescription coverage:  no  Family contact: no      Family requesting physician contact today:  no  Discharge plan: home where he lives with his grandparents  Access to weapons : no        Outpatient provider(s): yes therapy with Terrie MEDINA, Med management with [address]  Patient's preferred phone number for follow up call : [phone]     Participating treatment team members: Derek Combs, Reba Arce, Davina Salcedo, Dr. Mary Fernandez, Marilyn Skiff

## 2023-02-27 NOTE — PROGRESS NOTES
Problem: Depressed Mood (Adult/Pediatric)  Goal: *STG: Remains safe in hospital  Outcome: Progressing Towards Goal     Problem: Falls - Risk of  Goal: *Absence of falls  Outcome: Progressing Towards Goal     Problem: Tobacco Use  Goal: *Tobacco use abstinence  Outcome: Progressing Towards Goal

## 2023-02-27 NOTE — BH NOTES
Patient was admitted voluntary to the unit for depression, anxiety and SI. He is currently living with his 80year old grandmother [place]. He reports he is bored there during the day but is unsure if he could make it on his own. He is able to care for himself. He receives SSDI. He has a job at [employer] and this writer will help find a number so he can let them know he is in the hospital. He is still receiving his medication services through [facility] and it is in the process of being transferred to [facility]. He receives therapy with [facility]. He was very anxious and negative towards getting better. This writer offered many suggestions for activities during the day including a day program which he declined everything. He will return back home and follow up with [address] [place] [address] and continue with [facility] until services are transferred.

## 2023-02-27 NOTE — GROUP NOTE
[facility] GROUP DOCUMENTATION INDIVIDUAL                                                                          Group Therapy Note    Date: 2/27/2023    Group Start Time: 0900  Group End Time: 0950  Group Topic: Process Group - Inpatient    [facility] GROUP DOCUMENTATION GROUP    Group Therapy Note      Focus of session was based on information from The CBT Toolbox by Dr. Natalie Ritchie. We discussed identifying autopilot unhealthy coping skills specifically when feeling a high level of stress. We spoke about having the awareness of our go to unhealthy skills can help us make an effort to work through those moments. We also spoke about recognition of consequences of using those skills in the past to help get through stressful events. We also spoke about determining one healthy go to skill that group members are willing to put into action when under stress. Attendance: Attended    Patient's Goal:      Demonstrates reduction in symptoms and increase in insight into coping skills/future focused           Interventions/techniques: Challenged, Informed, Promoted peer support, and Supported    Follows Directions: Followed directions    Interactions: Interacted appropriately    Mental Status: Anxious, Congruent, and Preoccupied    Behavior/appearance: Attentive, Cooperative, and Needed prompting    Goals Achieved: Able to engage in interactions, Able to receive feedback, Able to self-disclose, Discussed coping, Identified feelings, Identified triggers, and Identified distorted thoughts/beliefs      Additional Notes:  Kade Mahoney participated in group with prompting. He spoke about the levels of depression he has been experiencing and how he is trying to manage it. He spoke about how he knows that he has been avoiding and trying \"to sleep the day away in order to feel better. \"  He spoke about the divorce from his wife and how his family tells him \"I ran her off but I think I would have changed if she gave me the chance. \"      Boyce Jeans

## 2023-02-27 NOTE — PROGRESS NOTES
Progress Note  Date:2023       Room:[facility]  Patient Name:Marco Antonio Alicea     YOB: 1980     Age:42 y.o. Subjective    Patient was ambulating in the hallway. He states his lower back pain is bothering him. He states lidocaine patch has helped him in the past.  Objective         Vitals Last 24 Hours:  TEMPERATURE:  Temp  Av.9 °F (36.6 °C)  Min: 97.7 °F (36.5 °C)  Max: 98 °F (36.7 °C)  RESPIRATIONS RANGE: Resp  Av.5  Min: 16  Max: 17  PULSE OXIMETRY RANGE: SpO2  Av %  Min: 98 %  Max: 98 %  PULSE RANGE: Pulse  Av.5  Min: 71  Max: 76  BLOOD PRESSURE RANGE: Systolic (74KNO), QYZ:609 , Min:102 , CIE:575   ; Diastolic (91ROK), QCS:22, Min:62, Max:63    I/O (24Hr): No intake or output data in the 24 hours ending 23 1307  Objective  Labs/Imaging/Diagnostics    Labs:  CBC:  Recent Labs     23  0954   WBC 8.2   RBC 4.05*   HGB 13.0   HCT 39.6   MCV 97.8   RDW 12.0        CHEMISTRIES:  Recent Labs     23  0954      K 3.9      CO2 30   BUN 14   CA 8.8   PT/INR:No results for input(s): INR, INREXT in the last 72 hours. No lab exists for component: PROTIME  APTT:No results for input(s): APTT in the last 72 hours. LIVER PROFILE:  Recent Labs     23  0954   AST 8*   ALT 17     Lab Results   Component Value Date/Time    ALT (SGPT) 17 2023 09:54 AM    AST (SGOT) 8 (L) 2023 09:54 AM    Alk. phosphatase 73 2023 09:54 AM    Bilirubin, total 0.5 2023 09:54 AM       Imaging Last 24 Hours:  No results found. Primary disorder:    Suicidal ideation    Bipolar disorder ([facility] [address] (2023)  - Patient is on Abilify and Lithium. Trileptal was added today. - Continue management per inpatient Psychiatry team     Active Problems:     Sciatica  - Continue Tylenol prn. Add Lidocaine patch. Patient encouraged on ambulation. Outpatient Neurosurgery follow up for further management.      Code status: Full  DVT prophylaxis: Ambulation     Care plan discussed with patient and nursing staff.      Total time spent: 25 minutes    Electronically signed by Noé Godfrey MD on 2/27/2023 at 1:07 PM

## 2023-02-27 NOTE — PROGRESS NOTES
Problem: Depressed Mood (Adult/Pediatric)  Goal: *STG: Complies with medication therapy  Outcome: Progressing Towards Goal  Note: Med compliant. Problem: Pain  Goal: *Control of Pain  Outcome: Progressing Towards Goal  Note: Sciatic exercises discussed and handouts given. Utilizing various methods of pain control, such as not sitting in chairs too long, stretching, exercises (ones discussed), pain patch and Tylenol.

## 2023-02-27 NOTE — BH NOTES
Affect flat, mood depressed/anxious. Alert and Oriented X 4. Cooperative and guarded. Withdrawn and isolative. Interacted with staff when prompted. Pt needs encouragement in participating. Avoids eye contact at times, speech soft/clear, motor  tremors, and appearance appropriate. Ate  snacks. Denied SI/HI/AVH. [facility]ol[facility] Screen Medication compliant. Stable with no s/s of distress. Laid in bed with eyes closed for 9 hours.      PRN Medication Documentation     Specific patient behavior that led to need for PRN medication: c/o pain 3/10 back  Staff interventions attempted prior to PRN being given: reposition  PRN medication given: tylenol 650 mg po [id number]  Patient response/effectiveness of PRN medication: tolerated

## 2023-02-27 NOTE — BH NOTES
Affect flat. Mood anxious/depressed. Alert and oriented x 4. Ate 100% of meals. Attended  group activity as active participant. Denies SI/HI/AVH. Med compliant. Denies SE's from meds except some sleepiness from the introduction of Tri Leptal today. Showered this evening. Worked on word puzzles. More engaged by end of shift. PRN Medication Documentation    Specific patient behavior that led to need for PRN medication: anxiety due to not being able to chew tobacco  Staff interventions attempted prior to PRN being given: assess, removed Nicotine patch  PRN medication given: Nicotine buccal 2mg lozenges given at 9617,7171, 7870,[id number]  Patient response/effectiveness of PRN medication: \"It helps me. \"     PRN Medication Documentation    Specific patient behavior that led to need for PRN medication: sciatic pain 7/10  Staff interventions attempted prior to PRN being given: assess  PRN medication given: Lidocaine patch 4% applied lower mid back at 1419  Patient response/effectiveness of PRN medication: 2/10     PRN Medication Documentation    Specific patient behavior that led to need for PRN medication: mild anxiety  Staff interventions attempted prior to PRN being given: assess  PRN medication given: Vistaril 50mg po given at [phone]  Patient response/effectiveness of PRN medication: decreased anxiety

## 2023-02-28 PROCEDURE — 99232 SBSQ HOSP IP/OBS MODERATE 35: CPT | Performed by: PSYCHIATRY & NEUROLOGY

## 2023-02-28 PROCEDURE — U0005 INFEC AGEN DETEC AMPLI PROBE: HCPCS

## 2023-02-28 PROCEDURE — 74011250637 HC RX REV CODE- 250/637: Performed by: PSYCHIATRY & NEUROLOGY

## 2023-02-28 PROCEDURE — 65220000003 HC RM SEMIPRIVATE PSYCH

## 2023-02-28 PROCEDURE — 74011250637 HC RX REV CODE- 250/637: Performed by: STUDENT IN AN ORGANIZED HEALTH CARE EDUCATION/TRAINING PROGRAM

## 2023-02-28 PROCEDURE — 74011000250 HC RX REV CODE- 250: Performed by: STUDENT IN AN ORGANIZED HEALTH CARE EDUCATION/TRAINING PROGRAM

## 2023-02-28 RX ORDER — IBUPROFEN 200 MG
1 TABLET ORAL DAILY
Status: DISCONTINUED | OUTPATIENT
Start: 2023-02-28 | End: 2023-03-07 | Stop reason: HOSPADM

## 2023-02-28 RX ADMIN — LITHIUM CARBONATE 300 MG: 300 TABLET, EXTENDED RELEASE ORAL at 08:08

## 2023-02-28 RX ADMIN — ACETAMINOPHEN 650 MG: 325 TABLET, FILM COATED ORAL at 16:51

## 2023-02-28 RX ADMIN — OXCARBAZEPINE 300 MG: 300 TABLET, FILM COATED ORAL at 18:00

## 2023-02-28 RX ADMIN — ARIPIPRAZOLE 10 MG: 5 TABLET ORAL at 08:08

## 2023-02-28 RX ADMIN — LORAZEPAM 1 MG: 1 TABLET ORAL at 16:10

## 2023-02-28 RX ADMIN — HYDROXYZINE PAMOATE 50 MG: 50 CAPSULE ORAL at 08:08

## 2023-02-28 RX ADMIN — NICOTINE POLACRILEX 2 MG: 2 LOZENGE ORAL at 07:40

## 2023-02-28 RX ADMIN — OXCARBAZEPINE 300 MG: 300 TABLET, FILM COATED ORAL at 08:08

## 2023-02-28 RX ADMIN — LITHIUM CARBONATE 300 MG: 300 TABLET, EXTENDED RELEASE ORAL at 18:00

## 2023-02-28 RX ADMIN — NICOTINE POLACRILEX 2 MG: 2 LOZENGE ORAL at 13:10

## 2023-02-28 NOTE — BH NOTES
Affect & mood labile. Patient alert and oriented x 4. Denies pain. No SI/HI/AVH. No delusional or paranoid statements made. Patient tearful and smiling. Patient cooperative and pleasant. Medication compliant but constantly requesting PRN's before scheduled times. Appetite good eats 100% of all meals plus snacks. Patient attended and engaged in groups with prompting. Patient in no apparent distress. Vitals signs within normal limits. PRN Medication Documentation    Specific patient behavior that led to need for PRN medication: Patient requested Nicotine lozenge to help decrease smoking cessation. Staff interventions attempted prior to PRN being given: Assessment done  PRN medication given: Lozenge 2 mg po given at 1310. Patient response/effectiveness of PRN medication: Very little effect. MD notified and discontinued Lozenge order and ordered Nicotine patch 21 mg daily to decrease smoking cessation. PRN Medication Documentation    Specific patient behavior that led to need for PRN medication: Patient very tearful after getting off the phone with his father. Staff interventions attempted prior to PRN being given: Assessment done. Patient upset and very anxious. PRN medication given: Ativan 1 mg po given at 1610 for severe anxiety. Patient response/effectiveness of PRN medication: Positive effects. PRN Medication Documentation    Specific patient behavior that led to need for PRN medication: Patient c/o having a headache and requested PRN Tylenol. Staff interventions attempted prior to PRN being given: Assessment done  PRN medication given: Tylenol 650 mg po at 1651. Patient response/effectiveness of PRN medication: Positive effects.

## 2023-02-28 NOTE — BH NOTES
Shift change [facility] [place] re: SBAR, medications, and behavior. Chung fall risk score:   Low

## 2023-02-28 NOTE — BH NOTES
PSYCHOSOCIAL ASSESSMENT  :Patient identifying info:   Jermaine Rangel is a 38 y.o., male admitted 2/25/2023  5:48 PM     Presenting problem and precipitating factors: Patient was admitted to unit voluntarily. He reports he has been experiencing depression with suicidal thoughts. He lives with his grandmother and is very isolated since he cannot drive until he gets issues with his license straightened out. He does have a job at this time. He reports his most significant psychosocial stressors are boredom and isolation and coping with his wife  him. Mental status assessment: Appearance: casually dressed; adequately groomed  Behavior: calm and cooperative; no agitation  Motor: normal  Mood/Affect: dysphoric; restricted  Thought Process: coherent; organized  Thought Content: no delusions; no SI/HI  Perceptions: no hallucinations  Insight/Judgment: fair    Strengths/Recreation/Coping Skills:  Patient reports he plays video games and that he likes to play cards. 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 He has a safe place to live and he is able to care for himself. Collateral information: Patient only    Current psychiatric /substance abuse providers and contact info: [address] provider is prescribing medications and he has a therapist now at [address] [place] [address]. Previous psychiatric/substance abuse providers and response to treatment: [facility]    Family history of mental illness or substance abuse: Patient reports mother and grandfather both suffered from depression. Substance abuse history:    Social History     Tobacco Use    Smoking status: Never    Smokeless tobacco: Not on file   Substance Use Topics    Alcohol use: Yes     Comment: socially     Patient did report in passing that his wife left him because of his drinking. He was guarded about details but stated \"I would have stay sober if she gave me a chance. \"  He reported that his family tells him that he \"ran her off. \"  He reports he now drinks socially and does not see much problem with it in his mind. History of biomedical complications associated with substance abuse: None    Patient's current acceptance of treatment or motivation for change: Patient denies that he has any issues with alcohol although he reports it was a factor in his marriage. Family constellation: He currently lives with his 80year old grandmother. He does comment that he has \"family support. \"   He was  and he does not have any kids. Is significant other involved? NA    Describe support system: Grandmother    Describe living arrangements and home environment: Lives with his grandmother on her farm. Other family is close by. GUARDIAN/POA: NO    Guardian Name: NA    Guardian Contact: NA    Health issues:   Hospital Problems  Date Reviewed: 2023            Codes Class Noted POA    * (Principal) Severe depressed bipolar I disorder without psychotic features [facility]) ICD-10-CM: F31.4  ICD-9-CM: 296.53  2023 Unknown           Trauma history: Denies    Legal issues: Denies    History of  service: Denies    Financial status: SSDI, has a new job at [employer] factors: No issues reported    Education/work history: Degree in     Have you been licensed as a health care professional (current or ): No    Describe coping skills:Video games, playing cards. He lacks insight to the fact that he has few healthy coping skills and can work to build on them significantly if he is willing to do the work.       Abad Fong  2023

## 2023-02-28 NOTE — PROGRESS NOTES
INTERVAL Hx:  Records and clinical information were reviewed. States he's still very depressed and that he's not sure he'll ever feel better again. He still reports some vague SI, but no plans or intent to act on them. He's very anxious at baseline and we discussed his history and illness, and what to expect going forward. Provided a lot of insights and supportive therapy but it's unclear if he's able to process it yet. Denies having any SE's with the Trileptal so far, or from decreasing the Abilify dose. The tremor seems to be less intense with the split dose of Lithium. Slept 5.5 hours last night. MEDS:  Current Facility-Administered Medications   Medication Dose Route Frequency    nicotine buccal (POLACRILEX) lozenge 2 mg  2 mg Oral Q2H PRN    ARIPiprazole (ABILIFY) tablet 10 mg  10 mg Oral DAILY    OXcarbazepine (TRILEPTAL) tablet 300 mg  300 mg Oral BID    LORazepam (ATIVAN) tablet 1 mg  1 mg Oral Q6H PRN    lidocaine 4 % patch 1 Patch  1 Patch TransDERmal DAILY PRN    lithium carbonate SR (LITHOBID) tablet 300 mg  300 mg Oral BID    magnesium hydroxide (MILK OF MAGNESIA) 400 mg/5 mL oral suspension 30 mL  30 mL Oral DAILY PRN    alum-mag hydroxide-simeth (MYLANTA) oral suspension 30 mL  30 mL Oral Q4H PRN    acetaminophen (TYLENOL) tablet 650 mg  650 mg Oral Q4H PRN    traZODone (DESYREL) tablet 50 mg  50 mg Oral QHS PRN    hydrOXYzine pamoate (VISTARIL) capsule 50 mg  50 mg Oral QID PRN    OLANZapine (ZyPREXA) 10 mg in sterile water (preservative free) 2 mL injection  10 mg IntraMUSCular BID PRN       VITALS: Patient Vitals for the past 12 hrs:   Temp Pulse Resp BP SpO2   02/28/23 0757 98.3 °F (36.8 °C) 69 18 98/64 98 %         LABS: .No results found for this or any previous visit (from the past 24 hour(s)).     MSE:   Appearance: casually dressed; adequately groomed  Behavior: calm and cooperative; no agitation  Motor: normal  Mood/Affect: dysphoric; restricted  Thought Process: coherent; organized  Thought Content: no delusions; no SI/HI  Perceptions: no hallucinations  Insight/Judgment: fair    ASSESSMENT:   1. Bipolar disorder, depressed, severe (F31.4)    PLAN:  1. Continue the LiSR at 300 mg BID. 2.  Continue the taper of Abilify (10 mg) and plan to stop it soon. 3.  Continue the Trileptal at 300 mg BID and plan to increase soon. 4.  Continue the PRN Trazodone and Vistaril. 5.  ELOS: 6-7 days.

## 2023-02-28 NOTE — GROUP NOTE
NIKHIL  GROUP DOCUMENTATION INDIVIDUAL                                                                          Group Therapy Note    Date: 2/28/2023    Group Start Time: 0900  Group End Time: 7385  Group Topic: Process Group - Inpatient    [address] [facility] OP    [place], [address] [facility] [address] [facility] GROUP DOCUMENTATION GROUP    Group Therapy Note    Focus of session was on developing healthy boundaries with others and the signs of healthy and unhealthy boundaries. We explored current relationships with group members and identified what kinds of boundaries are present and what boundaries need to be set. We spoke about physical, emotional, time, and intimate boundaries that we can set. We also spoke about how to allow others to set boundaries with us and to work to equip them with how to support in times of need and/or crisis. We identified what changes we can make today       Attendance: Attended    Patient's Goal:      Demonstrates reduction in symptoms and increase in insight into coping skills/future focused           Interventions/techniques: Informed, Validated, Reinforced, and Supported    Follows Directions: Followed directions    Interactions: Interacted appropriately    Mental Status: Anxious and Congruent    Behavior/appearance: Attentive, Cooperative, and Motivated    Goals Achieved: Able to engage in interactions, Able to listen to others, Able to reflect/comment on own behavior, Able to receive feedback, Able to self-disclose, Discussed coping, Discussed safety plan, Identified feelings, Identified triggers, Identified relapse prevention strategies, Identified medication adherence strategies, and Identified resources and support systems      Additional Notes:  Pt came to group late but participated and engaged with the other group members. He stated that he lives with his grandmother , dad and brother.   They were supportive of him calling 911 for help but he feels at times that they just want him to \"get over it \".  He shared that he made poor decisions during a manic episode last June and left his family and [place]  He feels he has lost everything, his wife, house ,belongings. He would like to get his medication worked out while staying inpatient.      Domenica Mayen

## 2023-02-28 NOTE — BH NOTES
Affect flat, mood depressed/anxious. Alert and Oriented X 4. Cooperative and guarded. Withdrawn and isolative. Interacted with staff when prompted. motor activity mild tremors. Denied SI/HI/AVH. Allena Closs Pt did have scheduled meds during shift. No PRNs admin. . Stable with no s/s of distress.   Laid in bed with eyes closed for 5 hours and 30 min

## 2023-02-28 NOTE — PROGRESS NOTES
Problem: Depressed Mood (Adult/Pediatric)  Goal: *STG: Remains safe in hospital  Outcome: Progressing Towards Goal  Goal: *STG: Complies with medication therapy  Outcome: Progressing Towards Goal     Problem: Patient Education: Go to Patient Education Activity  Goal: Patient/Family Education  Outcome: Progressing Towards Goal     Problem: Pain  Goal: *Control of Pain  Outcome: Progressing Towards Goal     Problem: Falls - Risk of  Goal: *Absence of falls  Outcome: Progressing Towards Goal     Problem: Tobacco Use  Goal: *Knowledge of tobacco use cessation methods  Outcome: Progressing Towards Goal     Problem: Patient Education: Go to Patient Education Activity  Goal: Patient/Family Education  Outcome: Progressing Towards Goal

## 2023-02-28 NOTE — BH NOTES
This writer met with patient today he reports feeling the same and has a negative outlook on his situation. This writer offers suggestions and encourages him to have a plan first before he decides to move as he was speaking about. He has been calm and cooperative and has been attending groups. Provided patient with the number to [facility] so he can make arrangements to reschedule his orientation. No discharge date has been set at this time. He will be returning back home with his grandmother.

## 2023-02-28 NOTE — PROGRESS NOTES
Problem: Depressed Mood (Adult/Pediatric)  Goal: *STG: Attends activities and groups  Outcome: Progressing Towards Goal  Goal: *STG: Remains safe in hospital  Outcome: Progressing Towards Goal  Goal: *STG: Complies with medication therapy  Outcome: Progressing Towards Goal     Problem: Depressed Mood (Adult/Pediatric)  Goal: *STG: Demonstrates reduction in symptoms and increase in insight into coping skills/future focused  Outcome: Not Met  Goal: *LTG: Returns to previous level of functioning and participates with after care plan  Outcome: Not Met  Goal: *LTG: Understands illness and can identify signs of relapse  Outcome: Not Met     Problem: Patient Education: Go to Patient Education Activity  Goal: Patient/Family Education  Outcome: Not Met

## 2023-03-01 LAB
SARS-COV-2 RNA RESP QL NAA+PROBE: NOT DETECTED
SOURCE, COVRS: NORMAL

## 2023-03-01 PROCEDURE — 65220000003 HC RM SEMIPRIVATE PSYCH

## 2023-03-01 PROCEDURE — 99232 SBSQ HOSP IP/OBS MODERATE 35: CPT | Performed by: PSYCHIATRY & NEUROLOGY

## 2023-03-01 PROCEDURE — 74011250637 HC RX REV CODE- 250/637: Performed by: STUDENT IN AN ORGANIZED HEALTH CARE EDUCATION/TRAINING PROGRAM

## 2023-03-01 PROCEDURE — 74011250637 HC RX REV CODE- 250/637: Performed by: PSYCHIATRY & NEUROLOGY

## 2023-03-01 RX ORDER — ARIPIPRAZOLE 5 MG/1
5 TABLET ORAL DAILY
Status: DISCONTINUED | OUTPATIENT
Start: 2023-03-01 | End: 2023-03-03

## 2023-03-01 RX ADMIN — LITHIUM CARBONATE 300 MG: 300 TABLET, EXTENDED RELEASE ORAL at 08:09

## 2023-03-01 RX ADMIN — ARIPIPRAZOLE 5 MG: 5 TABLET ORAL at 08:09

## 2023-03-01 RX ADMIN — OXCARBAZEPINE 300 MG: 300 TABLET, FILM COATED ORAL at 08:09

## 2023-03-01 RX ADMIN — LITHIUM CARBONATE 300 MG: 300 TABLET, EXTENDED RELEASE ORAL at 18:00

## 2023-03-01 RX ADMIN — OXCARBAZEPINE 300 MG: 300 TABLET, FILM COATED ORAL at 18:00

## 2023-03-01 NOTE — PROGRESS NOTES
02/28/23 2134 2000 [facility]   Attendance Attended   Number of participants 6   Time in 1950   Time out 2010   Total Time 20   MTP problem Depression   Interventions/techniques Informed   Follows directions Followed directions   Interactions Interacted appropriately   Mental Status Depressed; Worried   Behavior/appearance Attentive   Suicide Risk Factors no thoughts of hurting himself nor others

## 2023-03-01 NOTE — PROGRESS NOTES
Patient did not attend nor participate in community meeting with his peers this morning. Patient was sleep at time of meeting.   03/01/23 103 [facility] meeting   Attendance Did not attend   Number of participants 5   Time in 1030   Time out 1100   Total Time 30   MTP problem Depression; Fall risk   Interventions/techniques Provided feedback

## 2023-03-01 NOTE — BH NOTES
Affect blunted, mood depressed. Pt attended and participated in scheduled group activities. Pt was minimally social with staff and peers. Pt vital signs stable with no complaints of pain. Pt denies SI/HI/AVH. Pt ate 100% of snack. Pt has no scheduled medications for evening/night and did not request a PRN. Pt is in no apparent distress at this time and made no delusional statements. Pt rested in his bed with his eyes closed for 7.5 hours.

## 2023-03-01 NOTE — GROUP NOTE
NIKHIL  GROUP DOCUMENTATION INDIVIDUAL                                                                          Group Therapy Note    Date: 3/1/2023    Urbano Cedillo would not come to group this morning. We will continue to encourage group attendance and participation as part of his treatment plan.     Crispin Washington

## 2023-03-01 NOTE — BH NOTES
PRN Medication Documentation    Specific patient behavior that led to need for PRN medication: Patient requested a Lozenge to help decrease smoking cessation. Staff interventions attempted prior to PRN being given: Assessment done  PRN medication given: Lozenge 2 mg po given at 0740. Patient response/effectiveness of PRN medication: Mild effects.

## 2023-03-01 NOTE — BH NOTES
Affect flat/constricted, mood depressed/anxious. Patient alert and oriented x 4. Denies pain. No SI/HI/AVH. No delusional or paranoid statements made. Patient speech excessively soft. Patient cooperative and pleasant. Medication compliant and no PRN's requested or given. Appetite good eats 100% of all meals plus snacks. Patient attended and engaged in groups with prompting. Patient in no apparent distress. Vitals signs within normal limits.

## 2023-03-01 NOTE — PROGRESS NOTES
INTERVAL Hx:  Records and clinical information were reviewed. States he actually feels \"better\" today, and that he started noticing some slight improvement last evening. No obvious trigger for this, although he notes that he's thinking a little more optimistically and that there are some positive things to look forward to. Anxiety level is also less intense and we discussed his progress, the usual course with BPAD, and other ways it's impacted him. Provided a lot of insights and supportive therapy and he was able to process it more fully today. Denies having any SE's with the Trileptal so far, or from decreasing the Abilify dose. The tremor seems to be less intense with the split dose of Lithium. Slept 7.5 hours last night. MEDS:  Current Facility-Administered Medications   Medication Dose Route Frequency    ARIPiprazole (ABILIFY) tablet 5 mg  5 mg Oral DAILY    nicotine (NICODERM CQ) 21 mg/24 hr patch 1 Patch  1 Patch TransDERmal DAILY    OXcarbazepine (TRILEPTAL) tablet 300 mg  300 mg Oral BID    LORazepam (ATIVAN) tablet 1 mg  1 mg Oral Q6H PRN    lidocaine 4 % patch 1 Patch  1 Patch TransDERmal DAILY PRN    lithium carbonate SR (LITHOBID) tablet 300 mg  300 mg Oral BID    magnesium hydroxide (MILK OF MAGNESIA) 400 mg/5 mL oral suspension 30 mL  30 mL Oral DAILY PRN    alum-mag hydroxide-simeth (MYLANTA) oral suspension 30 mL  30 mL Oral Q4H PRN    acetaminophen (TYLENOL) tablet 650 mg  650 mg Oral Q4H PRN    traZODone (DESYREL) tablet 50 mg  50 mg Oral QHS PRN    hydrOXYzine pamoate (VISTARIL) capsule 50 mg  50 mg Oral QID PRN    OLANZapine (ZyPREXA) 10 mg in sterile water (preservative free) 2 mL injection  10 mg IntraMUSCular BID PRN       VITALS: Patient Vitals for the past 12 hrs:   Temp Pulse Resp BP SpO2   03/01/23 0741 (!) 96.7 °F (35.9 °C) 79 18 106/71 99 %         LABS: .No results found for this or any previous visit (from the past 24 hour(s)).     MSE:   Appearance: casually dressed; adequately groomed  Behavior: calm and cooperative; no agitation  Motor: normal  Mood/Affect: less dysphoric and restricted  Thought Process: coherent; organized  Thought Content: no delusions; no SI/HI  Perceptions: no hallucinations  Insight/Judgment: fair    ASSESSMENT:   1. Bipolar disorder, depressed, severe (F31.4)    PLAN:  1. Continue the LiSR at 300 mg BID. 2.  Continue the taper of Abilify to 5 mg--plan to stop soon. 3.  Continue the Trileptal at 300 mg BID--may increase if indicated. 4.  Continue the PRN Trazodone and Vistaril. 5.  ELOS: 3-5 days.

## 2023-03-01 NOTE — BH NOTES
Shift change report given to Shayla Shi re: SBAR, medications, and behavior. Chung fall risk score:   Low

## 2023-03-01 NOTE — BH NOTES
PRN Medication Documentation    Specific patient behavior that led to need for PRN medication: Patient in hallway pacing and c/o having anxiety. Staff interventions attempted prior to PRN being given: Assessment done. Staff offered patient PRN Vistaril. PRN medication given: Vistaril 50 mg po at 0808. Patient response/effectiveness of PRN medication: Positive effects. PRN Medication Documentation    Specific patient behavior that led to need for PRN medication: Patient requested a Lidocaine patch to help decrease lower back pain 3/10 d/t sciatic nerve pain. Staff interventions attempted prior to PRN being given: Assessment done. PRN medication given: Lidocaine 4% patch applied to lower back at 0808. Patient response/effectiveness of PRN medication: Positive effects.

## 2023-03-02 PROCEDURE — 74011250637 HC RX REV CODE- 250/637: Performed by: STUDENT IN AN ORGANIZED HEALTH CARE EDUCATION/TRAINING PROGRAM

## 2023-03-02 PROCEDURE — 65220000003 HC RM SEMIPRIVATE PSYCH

## 2023-03-02 PROCEDURE — 74011250637 HC RX REV CODE- 250/637: Performed by: PSYCHIATRY & NEUROLOGY

## 2023-03-02 PROCEDURE — 99232 SBSQ HOSP IP/OBS MODERATE 35: CPT | Performed by: PSYCHIATRY & NEUROLOGY

## 2023-03-02 RX ORDER — OXCARBAZEPINE 300 MG/1
300 TABLET, FILM COATED ORAL
Status: COMPLETED | OUTPATIENT
Start: 2023-03-02 | End: 2023-03-02

## 2023-03-02 RX ORDER — OXCARBAZEPINE 300 MG/1
600 TABLET, FILM COATED ORAL 2 TIMES DAILY
Status: DISCONTINUED | OUTPATIENT
Start: 2023-03-02 | End: 2023-03-07 | Stop reason: HOSPADM

## 2023-03-02 RX ADMIN — OXCARBAZEPINE 300 MG: 300 TABLET, FILM COATED ORAL at 09:01

## 2023-03-02 RX ADMIN — OXCARBAZEPINE 600 MG: 300 TABLET, FILM COATED ORAL at 17:51

## 2023-03-02 RX ADMIN — LITHIUM CARBONATE 300 MG: 300 TABLET, EXTENDED RELEASE ORAL at 09:01

## 2023-03-02 RX ADMIN — ARIPIPRAZOLE 5 MG: 5 TABLET ORAL at 09:01

## 2023-03-02 RX ADMIN — LITHIUM CARBONATE 300 MG: 300 TABLET, EXTENDED RELEASE ORAL at 17:51

## 2023-03-02 RX ADMIN — HYDROXYZINE PAMOATE 50 MG: 50 CAPSULE ORAL at 13:44

## 2023-03-02 RX ADMIN — OXCARBAZEPINE 300 MG: 300 TABLET, FILM COATED ORAL at 11:01

## 2023-03-02 NOTE — PROGRESS NOTES
03/01/23 2123 2000 [facility] meeting   Attendance Did not attend   Number of participants 5   Time in 1950   Time out 2015   Total Time 25   MTP problem Depression   Interventions/techniques Informed   Follows directions Followed directions   Interactions Interacted appropriately   Mental Status Calm; Worried   Behavior/appearance Attentive   Suicide Risk Factors no thoughts of hurting himself nor others

## 2023-03-02 NOTE — PROGRESS NOTES
Problem: Depressed Mood (Adult/Pediatric)  Goal: *STG: Participates in treatment plan  Outcome: Resolved/Met  Goal: *STG: Participates in 1:1 therapy sessions  Outcome: Resolved/Met  Goal: *STG: Verbalizes anger, guilt, and other feelings in a constructive manor  Outcome: Resolved/Met  Goal: *STG: Demonstrates reduction in symptoms and increase in insight into coping skills/future focused  Outcome: Resolved/Met  Goal: *STG: Remains safe in hospital  Outcome: Progressing Towards Goal  Goal: *STG: Complies with medication therapy  Outcome: Progressing Towards Goal     Problem: Pain  Goal: *Control of Pain  Outcome: Resolved/Met     Problem: Falls - Risk of  Goal: *Absence of falls  Outcome: Progressing Towards Goal     Problem: Tobacco Use  Goal: *Tobacco use abstinence  Outcome: Resolved/Met  Goal: *Knowledge of tobacco use cessation methods  Outcome: Resolved/Met

## 2023-03-02 NOTE — PROGRESS NOTES
Problem: Depressed Mood (Adult/Pediatric)  Goal: *STG: Attends activities and groups  Outcome: Progressing Towards Goal     Problem: Depressed Mood (Adult/Pediatric)  Goal: *STG: Remains safe in hospital  Outcome: Progressing Towards Goal     Problem: Depressed Mood (Adult/Pediatric)  Goal: *STG: Complies with medication therapy  Outcome: Progressing Towards Goal     Problem: Depressed Mood (Adult/Pediatric)  Goal: *LTG: Understands illness and can identify signs of relapse  Outcome: Progressing Towards Goal

## 2023-03-02 NOTE — BH NOTES
Shift change report given to Linsey Ash RN re: SBAR, medications, and behavior.    fall risk score low

## 2023-03-02 NOTE — MASTER TREATMENT PLAN
Master Treatment Plan Review for Salvador Desai    Date of Admission Date Treatment Plan Reviewed Anticipated Date of Discharge Legal Status    2/25/23 3/1/23 TBD Voluntary     Treatment & Discharge Planning Changes    Modality or Intervention Changes 2/28- Lab SARS-COV-2     Psychotropic Medication Changes 2/26- Lithobid 300 mg  po BID; Abilify 20 mg po daily  2/27- Modified Abilify 10 mg po daily; Trileptal 300 mg po BID;           -Ativan 1 mg po Q6hr PRN  3/1 - Modified Abilify 5 mg po daily    Discharge Planning or Target Date Changes ELOS: 3-5 days    New Issues N/A       Treatment Team Signatures    I have participated in the development of this plan of treatment and agree to its implementation.     Patient Signature       Patient Printed Name Date/Time   /Therapist Signature       //Therapist Printed Name Date/Time   RN Signature       RN Printed Name Date/Time   Unit  Signature       Unit  Printed Name Date/Time   MD Signature       MD Printed Name Date/Time

## 2023-03-02 NOTE — GROUP NOTE
NIKHIL  GROUP DOCUMENTATION INDIVIDUAL                                                                          Group Therapy Note    Date: 3/2/2023    Group Start Time: 0900  Group End Time: 0950  Group Topic: Process Group - Inpatient    [address] [facility] OP    [place], [address] [facility] 1150 [facility] GROUP DOCUMENTATION GROUP    Group Therapy Note    Focus of session was on understanding and implementing the worry tree as a way to eliminate worry about things in our lives that we currently cannot do anything about. We discussed how about 90% of our current worries are about the unimportant, the unlikely, and the unresolved. I shared with group how the worry tree can be effective for dealing with worries to let them go, even if they have to do it over and over. I had group members share a worry that is able to be resolved and one that is not at this time and how to practice letting go of that worry in order to free up emotional energy and space in our minds. Group members were given an assignment to let go of one worry this week that is resolvable or one that is not. Attendance: Attended    Patient's Goal:      Attends activities and groups           Interventions/techniques: Informed, Validated, Reinforced, and Supported    Follows Directions: Followed directions    Interactions: Interacted appropriately    Mental Status: Calm and Congruent    Behavior/appearance: Attentive, Cooperative, and Neatly groomed    Goals Achieved: Able to engage in interactions, Able to listen to others, Able to receive feedback, Able to self-disclose, Discussed coping, Discussed self-esteem issues, Identified feelings, and Identified medication adherence strategies      Additional Notes:  Pt listened to the group activity and discussion. He engaged when prompted and stated he felt he was doing ok. He shared with the group about his family and living on the farm for the last four months.  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 He had a dog that he was very close with but due to his situation he has to give it up.       Taj Mauro

## 2023-03-02 NOTE — BH NOTES
Affect constricted, mood depressed/anxious. , calm and cooperative. Alert and oriented X 4. Pt endorses depression and anxiety worse today. Attended and participated in group. Interacted with staff and peers minimally. Makes needs known. Asked for medication for anxiety. Ate all meals and snacks. Denied SI/HI/AVH and pain. Medication compliant. Stable but complains of distress from depression and anxiety. PRN Medication Documentation    Specific patient behavior that led to need for PRN medication: Pt complained of increased anxiety. Staff interventions attempted prior to PRN being given: Talked to patient about his anxiety and depression. PRN medication given: 13:48 pt was given Hydroxyzine 50 mg po anxiety. Patient response/effectiveness of PRN medication: Pt states that that anxiety a little less and that the medicine helped. 1800-  Pt resting. Up for dinner and afterwards back to bed.

## 2023-03-02 NOTE — BH NOTES
Affect flat, mood depressed/anxious. Alert and Oriented X 4. Cooperative and guarded. Withdrawn and isolative. Interacted with staff when prompted. Refused to attend group. Stayed in bed entire shift. Pt did talk on phone with friend, Asadmercedes Romerotanya. Pt. motor activity mild tremors. Denied SI/HI/AVH and pain. . Pt did not have scheduled meds during shift. No PRNs admin. No delusional statements made. . Stable with no s/s of distress.   Laid in bed with eyes closed for 7 hours

## 2023-03-02 NOTE — PROGRESS NOTES
INTERVAL Hx:  Records and clinical information were reviewed. States he awoke feeling very depressed again after feeling moderately brighter all day yesterday. Spent time educating him and providing support and insights re: the nature of BPAD and the unpredictable course it often has. Also discussed Rx plans and the rationale for them and the likely plan going forward. Denies having any SE's with the Trileptal so far and I'll increase it to 600 mg BID as planned. Will continue the Abilify 1-2 more days, but we may need to augment further depending on his progress. Slept 7 hours last night. MEDS:  Current Facility-Administered Medications   Medication Dose Route Frequency    OXcarbazepine (TRILEPTAL) tablet 600 mg  600 mg Oral BID    OXcarbazepine (TRILEPTAL) tablet 300 mg  300 mg Oral NOW    ARIPiprazole (ABILIFY) tablet 5 mg  5 mg Oral DAILY    nicotine (NICODERM CQ) 21 mg/24 hr patch 1 Patch  1 Patch TransDERmal DAILY    LORazepam (ATIVAN) tablet 1 mg  1 mg Oral Q6H PRN    lidocaine 4 % patch 1 Patch  1 Patch TransDERmal DAILY PRN    lithium carbonate SR (LITHOBID) tablet 300 mg  300 mg Oral BID    magnesium hydroxide (MILK OF MAGNESIA) 400 mg/5 mL oral suspension 30 mL  30 mL Oral DAILY PRN    alum-mag hydroxide-simeth (MYLANTA) oral suspension 30 mL  30 mL Oral Q4H PRN    acetaminophen (TYLENOL) tablet 650 mg  650 mg Oral Q4H PRN    traZODone (DESYREL) tablet 50 mg  50 mg Oral QHS PRN    hydrOXYzine pamoate (VISTARIL) capsule 50 mg  50 mg Oral QID PRN    OLANZapine (ZyPREXA) 10 mg in sterile water (preservative free) 2 mL injection  10 mg IntraMUSCular BID PRN       VITALS: Patient Vitals for the past 12 hrs:   Temp Pulse Resp BP SpO2   03/02/23 0737 98.7 °F (37.1 °C) 82 18 102/67 96 %         LABS: .No results found for this or any previous visit (from the past 24 hour(s)).     MSE:   Appearance: casually dressed; adequately groomed  Behavior: no agitation; more withdrawn  Motor: normal  Mood/Affect: more dysphoric and anxious  Thought Process: coherent; organized  Thought Content: no delusions; no SI/HI  Perceptions: no hallucinations  Insight/Judgment: fair    ASSESSMENT:   1. Bipolar disorder, depressed, severe (F31.4)    PLAN:  1. Continue the LiSR at 300 mg BID. 2.  Continue the taper of Abilify to 5 mg--plan to stop soon. 3.  Increase the Trileptal to 600 mg BID. 4.  Continue the PRN Trazodone and Vistaril. 5.  ELOS: 4-7 days, depending on his progress/status.

## 2023-03-02 NOTE — ROUTINE PROCESS
Shift change report given to Traci Akbar RN; re:SBAR, medications, and behavior. Fall Risk Score low.

## 2023-03-02 NOTE — BH NOTES
Patient spoke about how he has been very labile. He has been exploring places to live in his mind. Encouraged him to get settled after discharge, pursue his new job and then see if he is still interested in moving. He spoke with someone at [facility] and they are able to reschedule his orientation. He has been talking to a female and he reports this has lifted his spirits. His family tells him the hard truth which makes patient frustrated and he says he thinks they are right with a lot of what they say. He does look better than on admission. He denies SI/HI. Medications are still be adjusted. He does participate in treatment and is appropriate with peers and staff. He is connected with out patient providers and has a place to return to.

## 2023-03-03 LAB
EST. AVERAGE GLUCOSE BLD GHB EST-MCNC: 100 MG/DL
HBA1C MFR BLD: 5.1 % (ref 4–5.6)
LITHIUM SERPL-SCNC: 0.47 MMOL/L (ref 0.6–1.2)

## 2023-03-03 PROCEDURE — 80178 ASSAY OF LITHIUM: CPT

## 2023-03-03 PROCEDURE — 83036 HEMOGLOBIN GLYCOSYLATED A1C: CPT

## 2023-03-03 PROCEDURE — 65220000003 HC RM SEMIPRIVATE PSYCH

## 2023-03-03 PROCEDURE — 74011250637 HC RX REV CODE- 250/637: Performed by: STUDENT IN AN ORGANIZED HEALTH CARE EDUCATION/TRAINING PROGRAM

## 2023-03-03 PROCEDURE — 74011250637 HC RX REV CODE- 250/637: Performed by: PSYCHIATRY & NEUROLOGY

## 2023-03-03 PROCEDURE — 36415 COLL VENOUS BLD VENIPUNCTURE: CPT

## 2023-03-03 PROCEDURE — 99231 SBSQ HOSP IP/OBS SF/LOW 25: CPT | Performed by: PSYCHIATRY & NEUROLOGY

## 2023-03-03 RX ORDER — LITHIUM CARBONATE 300 MG/1
300 TABLET, FILM COATED, EXTENDED RELEASE ORAL DAILY
Status: DISCONTINUED | OUTPATIENT
Start: 2023-03-04 | End: 2023-03-07 | Stop reason: HOSPADM

## 2023-03-03 RX ORDER — LITHIUM CARBONATE 300 MG/1
600 TABLET, FILM COATED, EXTENDED RELEASE ORAL
Status: DISCONTINUED | OUTPATIENT
Start: 2023-03-03 | End: 2023-03-07 | Stop reason: HOSPADM

## 2023-03-03 RX ADMIN — OXCARBAZEPINE 600 MG: 300 TABLET, FILM COATED ORAL at 17:30

## 2023-03-03 RX ADMIN — OXCARBAZEPINE 600 MG: 300 TABLET, FILM COATED ORAL at 09:18

## 2023-03-03 RX ADMIN — LITHIUM CARBONATE 600 MG: 300 TABLET, EXTENDED RELEASE ORAL at 21:07

## 2023-03-03 RX ADMIN — LITHIUM CARBONATE 300 MG: 300 TABLET, EXTENDED RELEASE ORAL at 09:18

## 2023-03-03 RX ADMIN — ACETAMINOPHEN 650 MG: 325 TABLET, FILM COATED ORAL at 14:48

## 2023-03-03 NOTE — BH NOTES
Behavioral Health Interdisciplinary Rounds     Patient Name: Asa Layne  Age: 38 y.o. Room/Bed:  230/01  Primary Diagnosis: Severe depressed bipolar I disorder without psychotic features [facility]   Admission Status: Voluntary     Readmission within 30 days: no  Power of  in place: no  Patient requires a blocked bed: no          Reason for blocked bed:     VTE Prophylaxis: Not indicated    Mobility needs/Fall risk: yes  Flu Vaccine : no   Nutritional Plan: no  Consults: no         Labs/Testing due today?: no    Sleep hours: 8.5       Participation in Care/Groups:  yes  Medication Compliant?: Yes  PRNS (last 24 hours): Antianxiety    Restraints (last 24 hours):  no     CIWA (range last 24 hours):     COWS (range last 24 hours):      Alcohol screening (AUDIT) completed -   AUDIT Score: 0     If applicable, date SBIRT discussed in treatment team AND documented:   AUDIT Screen Score: AUDIT Score: 0      Document Brief Intervention (corresponds directly with the 5 A's, Ask, Advise, Assess, Assist, and Arrange):  n/a    At- Risk Patients (Score 7-15 for women; 8-15 for men)  Discuss concern patient is drinking at unhealthy levels known to increase risk of alcohol-related health problems. Is Patient ready to commit to change? N/a    If No:  Encourage reflection  Discuss short term and long term health risks of consuming alcohol  Barriers to change  Reaffirm willingness to help / Educational materials provided  If Yes:  Set goal  Plan  Educational materials provided    Harmful use or Dependence (Score 16 or greater)  Discuss short term and long term health risks of consuming alcohol  Recommendations  Negotiate drinking goal  Recommend addiction specialist/center  Arrange follow-up appointments.     Tobacco - patient is a smoker: Have You Used Tobacco in the Past 30 Days: Yes (chews tobacco)  Illegal Drugs use: Have You Used Any Illegal Substances Over the Past 12 Months: No    24 hour chart check complete: yes Patient goal(s) for today: to get better and stay positive  Treatment team focus/goals: Remains safe in hospital  Progress note patient is still reporting depression and does not remember when he reported that he was feeling better, he has been cooperative with treatment and appropriate with others.  Remains negative    LOS:  6  Expected LOS: 4-7    Financial concerns/prescription coverage:  no  Family contact: no      Family requesting physician contact today:  no  Discharge plan: home where he lives with family  Access to weapons : no        Outpatient provider(s): yes therapy with [place] and med management from [place]  Patient's preferred phone number for follow up call : [phone]    Participating treatment team members: Vandana Riggins Florrie Manzanilla Dr. Rosevelt Cousin (assigned SW), Sandra Jones

## 2023-03-03 NOTE — PROGRESS NOTES
03/03/23 901 [employer] meeting   Attendance Attended   Number of participants 4   Time in 1025   Time out 1115   Total Time [id number] [facility] was out for the Group meeting today. He stayed a while and talked in group but left before answering his questions. The only question he did say was he was having a good day.     Huey Marti CNA

## 2023-03-03 NOTE — BH NOTES
Affect flat. Mood depressed/anxious. Alert and oriented x 4. Cooperative and med compliant. Denies SI/HI/AVH/pain. Using Nicotine patch for cessation and feels it is \"helping. \"  \"I'm trying to make myself do things and interact, even though I have an inclination to sleep all the time. \"  Ate well. Attended group activities as active participant. Feels he is not have SE''s of meds and that Mickey Leal are working. \" Encouraged to walk laps in [place] as a goal and did 48 today. Worked on puzzle with peers. Interacts appropriately with others.     PRN Medication Documentation    Specific patient behavior that led to need for PRN medication: lower back pain 3/10  Staff interventions attempted prior to PRN being given: assess  PRN medication given: Tylenol 650mg po given at [address] [address] Motta,Bipin [address]  Patient response/effectiveness of PRN medication: pain at 1/10 at  [phone]

## 2023-03-03 NOTE — GROUP NOTE
NIKHIL  GROUP DOCUMENTATION INDIVIDUAL                                                                          Group Therapy Note    Date: 3/3/2023    Group Start Time: 0900  Group End Time: 7181  Group Topic: Process Group - Inpatient    [address] [facility]    [place], [address] [facility] [address] [facility] [address] GROUP DOCUMENTATION GROUP    Group Therapy Note    Focus of session was based on handout from therapistaid on exploring our personal strengths. We spoke about how this is a good strategy when we are dealing with a lot of critical self-talk and negative self-esteem. We discussed how this can help us to see what our strengths are and how we can use them in relationships and in our daily life. Group members were encouraged to see how they can use their strengths in new ways and help them see their potential to help them build up more positive self-talk and also improve their day to day functioning       Attendance: Attended    Patient's Goal:      Attends activities and groups (          Interventions/techniques: Informed, Validated, Reinforced, and Supported    Follows Directions: Followed directions    Interactions: Interacted appropriately    Mental Status: Calm and Congruent    Behavior/appearance: Attentive, Cooperative, and Motivated    Goals Achieved: Able to engage in interactions, Able to listen to others, Discussed coping, Discussed discharge plans, Identified feelings, Identified triggers, Identified medication adherence strategies, and Identified resources and support systems      Additional Notes:  Pt participated in the group discussion and activity,  He was able to list some of his strengths as kindness, enthusiasm and ambitious. He stated that he has a degree in [employer]. He would like to go back to doing a job related to that if he can settled. He feels that he needs to work on patience as that is something that gets in his way of achieving things.       Domenica Mayen

## 2023-03-03 NOTE — PROGRESS NOTES
INTERVAL Hx:  Records and clinical information were reviewed. States he's very depressed again today. Seemed to feel better off and on yesterday after he was so depressed in the AM, but he doesn't seem to remember those moments of improvement. Continued to educate him and provide support and insights re: his BPAD and the unpredictable course it often has. Also discussed Rx plans and the rationale for them and the plan going forward. Denies having any SE's with the increased Trileptal. Will increase the lithium SR to 300/600 today and watch for any worsening SE's. [Li] drawn this AM on 600 mg/day. May need to augment further depending on his progress. Slept 8.5 hours last night.     MEDS:  Current Facility-Administered Medications   Medication Dose Route Frequency    [START ON 3/4/2023] lithium carbonate SR (LITHOBID) tablet 300 mg  300 mg Oral DAILY    lithium carbonate SR (LITHOBID) tablet 600 mg  600 mg Oral QHS    OXcarbazepine (TRILEPTAL) tablet 600 mg  600 mg Oral BID    nicotine (NICODERM CQ) 21 mg/24 hr patch 1 Patch  1 Patch TransDERmal DAILY    LORazepam (ATIVAN) tablet 1 mg  1 mg Oral Q6H PRN    lidocaine 4 % patch 1 Patch  1 Patch TransDERmal DAILY PRN    magnesium hydroxide (MILK OF MAGNESIA) 400 mg/5 mL oral suspension 30 mL  30 mL Oral DAILY PRN    alum-mag hydroxide-simeth (MYLANTA) oral suspension 30 mL  30 mL Oral Q4H PRN    acetaminophen (TYLENOL) tablet 650 mg  650 mg Oral Q4H PRN    traZODone (DESYREL) tablet 50 mg  50 mg Oral QHS PRN    hydrOXYzine pamoate (VISTARIL) capsule 50 mg  50 mg Oral QID PRN    OLANZapine (ZyPREXA) 10 mg in sterile water (preservative free) 2 mL injection  10 mg IntraMUSCular BID PRN       VITALS: Patient Vitals for the past 12 hrs:   Temp Pulse Resp BP SpO2   03/03/23 0747 (!) 96 °F (35.6 °C) 84 17 106/66 98 %         LABS: .  Recent Results (from the past 24 hour(s))   HEMOGLOBIN A1C WITH EAG    Collection Time: 03/03/23  6:01 AM   Result Value Ref Range    Hemoglobin A1c 5.1 4.0 - 5.6 %    Est. average glucose 100 mg/dL       MSE:   Appearance: casually dressed; adequately groomed  Behavior: no agitation; more withdrawn  Motor: normal  Mood/Affect:  dysphoric currently; some lability  Thought Process: coherent; organized  Thought Content: no delusions; no SI/HI  Perceptions: no hallucinations  Insight/Judgment: fair    ASSESSMENT:   1. Bipolar disorder, depressed, severe (F31.4)    PLAN:  1. Increase the LiSR to 300 mg daily and 600 mg QHS. [Li] drawn this AM (on 600 mg/day)  2. Continue the Trileptal at 600 mg BID. 3.  Continue the PRN Trazodone and Vistaril. 4.  ELOS: 4-7 days, depending on his progress/status.

## 2023-03-03 NOTE — BH NOTES
Affect flat, mood depressed/anxious. Alert and Oriented X 4. Cooperative and guarded. Withdrawn and isolative. Interacted with staff when prompted. Refused to attend group. Stayed in bed entire shift. Pt. motor activity mild tremors. Denied SI/HI/AVH and pain. . Pt did not have scheduled meds during shift. No PRNs admin. No delusional statements made. . Stable with no s/s of distress.   Laid in bed with eyes closed for 8 hours and 30 min

## 2023-03-03 NOTE — BH NOTES
Patient has been in and out of dayroom today. Patient is social and appropriate with peers and staff. He has been walking for exercise today and has worked on a puzzle. He engages in conversation, he does his ADL care. He does admit to feeling better than before. He will return home with his family. [facility] [place] until services can be transferred to [facility]. His therapist is at [address] [place] [address]. He has not engaged in any negative thinking today.

## 2023-03-03 NOTE — PROGRESS NOTES
Problem: Depressed Mood (Adult/Pediatric)  Goal: *STG: Remains safe in hospital  Outcome: Progressing Towards Goal  Note: Affect flat. Mood depressed/anxious. Alert and oriented x 4. Cooperative and med compliant. Denies SI/HI/AVH/pain. Using Nicotine patch for cessation and feels it is \"helping. \"  \"I'm trying to make myself do things and interact, even though I have an inclination to sleep all the time. \"  Ate well. Attended group activities as active participant. Feels he is not have SE''s of meds and that Kieran Xie are working. \"

## 2023-03-03 NOTE — PROGRESS NOTES
03/02/23 2028   Group Therapy   Group Community meeting   Attendance Did not attend   Number of participants 4   Time in 2000   Time out 2020   Total Time 20   MTP problem Depression   Interventions/techniques Informed

## 2023-03-04 PROCEDURE — 74011250637 HC RX REV CODE- 250/637: Performed by: PSYCHIATRY & NEUROLOGY

## 2023-03-04 PROCEDURE — 74011250637 HC RX REV CODE- 250/637: Performed by: STUDENT IN AN ORGANIZED HEALTH CARE EDUCATION/TRAINING PROGRAM

## 2023-03-04 PROCEDURE — 65220000003 HC RM SEMIPRIVATE PSYCH

## 2023-03-04 PROCEDURE — 99233 SBSQ HOSP IP/OBS HIGH 50: CPT | Performed by: PSYCHIATRY & NEUROLOGY

## 2023-03-04 RX ORDER — PROPRANOLOL HYDROCHLORIDE 10 MG/1
10 TABLET ORAL 3 TIMES DAILY
Status: DISCONTINUED | OUTPATIENT
Start: 2023-03-04 | End: 2023-03-07 | Stop reason: HOSPADM

## 2023-03-04 RX ADMIN — LITHIUM CARBONATE 300 MG: 300 TABLET, EXTENDED RELEASE ORAL at 08:07

## 2023-03-04 RX ADMIN — LITHIUM CARBONATE 600 MG: 300 TABLET, EXTENDED RELEASE ORAL at 21:40

## 2023-03-04 RX ADMIN — PROPRANOLOL HYDROCHLORIDE 10 MG: 10 TABLET ORAL at 21:40

## 2023-03-04 RX ADMIN — OXCARBAZEPINE 600 MG: 300 TABLET, FILM COATED ORAL at 17:19

## 2023-03-04 RX ADMIN — OXCARBAZEPINE 600 MG: 300 TABLET, FILM COATED ORAL at 08:07

## 2023-03-04 RX ADMIN — PROPRANOLOL HYDROCHLORIDE 10 MG: 10 TABLET ORAL at 16:05

## 2023-03-04 RX ADMIN — ACETAMINOPHEN 650 MG: 325 TABLET, FILM COATED ORAL at 14:55

## 2023-03-04 RX ADMIN — ACETAMINOPHEN 650 MG: 325 TABLET, FILM COATED ORAL at 08:10

## 2023-03-04 NOTE — PROGRESS NOTES
Problem: Depressed Mood (Adult/Pediatric)  Goal: *STG: Attends activities and groups  Outcome: Progressing Towards Goal  Note: Active participant in group activities. Cooperative and med compliant. Showered and walked laps today. Wrote in journal today. Tearful this morning about thinking and dreaming of ex wife. Denies SI/HI/AVH. Interacts well with others. Speech logical and linear.

## 2023-03-04 NOTE — PROGRESS NOTES
Behavioral Services                                              Medicare Re-Certification    I certify that the inpatient psychiatric hospital services furnished since the previous certification/re-certification were, and continue to be, medically necessary for;    [x] (1) Treatment which could reasonably be expected to improve the patient's condition,    [x] (2) Or for diagnostic study. Estimated length of stay/service 4-6 days    Plan for post-hospital care home    This patient continues to need, on a daily basis, active treatment furnished directly by or requiring the supervision of inpatient psychiatric personnel.     Electronically signed by Lita Couch MD on 3/4/2023 at 10:58 AM

## 2023-03-04 NOTE — BH NOTES
Active participant in group activities. Cooperative and med compliant. Showered and walked laps today. Wrote in journal today. Tearful this morning about thinking and dreaming of ex wife. Denies SI/HI/AVH. Interacts well with others. Speech logical and linear. Played corn hole with peers. \"This is the best day I have had! \"    PRN Medication Documentation    Specific patient behavior that led to need for PRN medication: sciatic back pain of 3/10  Staff interventions attempted prior to PRN being given: assess  PRN medication given: Tylenol 650mg po given at 0810  Patient response/effectiveness of PRN medication: 1/10 back pain at 0910     PRN Medication Documentation    Specific patient behavior that led to need for PRN medication: 3/10 sciatic back pain  Staff interventions attempted prior to PRN being given: assess  PRN medication given: Tylenol 650mg po given at 1455  Patient response/effectiveness of PRN med:0/10 back pain at [phone]

## 2023-03-04 NOTE — BH NOTES
Affect blunted, mood depressed. Pt did not attend or participate in scheduled group activities. Pt did come up for a few moments after group was over but did not want to socialize or eat any snack. Pt vital signs stable with no complaints of pain. Pt denies SI/HI/AVH. Pt was compliant with scheduled medications and did not request a PRN. Pt is in no apparent distress at this time and made no delusional statements. Pt rested in his bed with his eyes closed for 9.25 hours.

## 2023-03-04 NOTE — BH NOTES
INTERVAL Hx:  Records and clinical information were reviewed. States he's very depressed again today. Seemed to feel better off and on yesterday after he was so depressed in the AM, but he doesn't seem to remember those moments of improvement. Continued to educate him and provide support and insights re: his BPAD and the unpredictable course it often has. Also discussed Rx plans and the rationale for them and the plan going forward. Denies having any SE's with the increased Trileptal. Will increase the lithium SR to 300/600 today and watch for any worsening SE's. [Li] drawn this AM on 600 mg/day. May need to augment further depending on his progress. Slept 8.5 hours last night. 03/04 - overnight, the patient has been visible, cooperative but still dysphoric, endorsing SI. Patient reports being tearful this morning. Lithium level return, it is low at 0.47. Discussed increasing dose, per chart review attending psychiatrist did last night prior to level being drawn. Pt c/o UE tremor BL which was present prior to initiating lithium, he is agreeable with starting a low dose beta blocker to address any worsening of the tremor with increased lithium dose. BP hsa been low at 100s/60s.      MEDS:  Current Facility-Administered Medications   Medication Dose Route Frequency    propranoloL (INDERAL) tablet 10 mg  10 mg Oral TID    lithium carbonate SR (LITHOBID) tablet 300 mg  300 mg Oral DAILY    lithium carbonate SR (LITHOBID) tablet 600 mg  600 mg Oral QHS    OXcarbazepine (TRILEPTAL) tablet 600 mg  600 mg Oral BID    nicotine (NICODERM CQ) 21 mg/24 hr patch 1 Patch  1 Patch TransDERmal DAILY    LORazepam (ATIVAN) tablet 1 mg  1 mg Oral Q6H PRN    lidocaine 4 % patch 1 Patch  1 Patch TransDERmal DAILY PRN    magnesium hydroxide (MILK OF MAGNESIA) 400 mg/5 mL oral suspension 30 mL  30 mL Oral DAILY PRN    alum-mag hydroxide-simeth (MYLANTA) oral suspension 30 mL  30 mL Oral Q4H PRN    acetaminophen (TYLENOL) tablet 650 mg 650 mg Oral Q4H PRN    traZODone (DESYREL) tablet 50 mg  50 mg Oral QHS PRN    hydrOXYzine pamoate (VISTARIL) capsule 50 mg  50 mg Oral QID PRN    OLANZapine (ZyPREXA) 10 mg in sterile water (preservative free) 2 mL injection  10 mg IntraMUSCular BID PRN       VITALS: Patient Vitals for the past 12 hrs:   Temp Pulse Resp BP SpO2   03/04/23 0743 96.9 °F (36.1 °C) 89 18 108/60 98 %         LABS: .No results found for this or any previous visit (from the past 24 hour(s)). MSE:   Appearance: casually dressed; adequately groomed  Behavior: no agitation; more withdrawn  Motor: normal  Mood/Affect:  dysphoric currently; some lability  Thought Process: coherent; organized  Thought Content: no delusions; no SI/HI  Perceptions: no hallucinations  Insight/Judgment: fair    ASSESSMENT:   1. Bipolar disorder, depressed, severe (F31.4)    PLAN:  1.  CONTINUE LiSR 300 mg daily and 600 mg QHS. (Li level 0.47 on 600 mg/d)  2. START Propranolol 10 mg TID for essential tremor, titrate and change to LA if tolerating  3. Continue the Trileptal at 600 mg BID. 4.  Continue the PRN Trazodone and Vistaril. 5.  ELOS: 4-7 days, depending on his progress/status.

## 2023-03-04 NOTE — PROGRESS NOTES
Problem: Depressed Mood (Adult/Pediatric)  Goal: *STG: Attends activities and groups  Outcome: Progressing Towards Goal  Goal: *STG: Remains safe in hospital  Outcome: Progressing Towards Goal  Goal: *STG: Complies with medication therapy  Outcome: Progressing Towards Goal     Problem: Falls - Risk of  Goal: *Absence of falls  Outcome: Progressing Towards Goal

## 2023-03-04 NOTE — PROGRESS NOTES
03/03/23 2034   Group Therapy   Group Community meeting   Attendance Did not attend   Number of participants 2   Time in 2000   Time out 2030   Total Time 27     Isra Whitaker was not out for the Wrap-up Group tonight. He was asked but did not come out.     Billie Ross, CNA

## 2023-03-04 NOTE — BH NOTES
Prince Joels was out in the Group Activity today they were playing [employer].     Luz Marina Edmond CNA

## 2023-03-05 PROCEDURE — 74011250637 HC RX REV CODE- 250/637: Performed by: PSYCHIATRY & NEUROLOGY

## 2023-03-05 PROCEDURE — 65220000003 HC RM SEMIPRIVATE PSYCH

## 2023-03-05 PROCEDURE — 99232 SBSQ HOSP IP/OBS MODERATE 35: CPT | Performed by: PSYCHIATRY & NEUROLOGY

## 2023-03-05 PROCEDURE — 74011250637 HC RX REV CODE- 250/637: Performed by: STUDENT IN AN ORGANIZED HEALTH CARE EDUCATION/TRAINING PROGRAM

## 2023-03-05 RX ADMIN — ACETAMINOPHEN 650 MG: 325 TABLET, FILM COATED ORAL at 12:34

## 2023-03-05 RX ADMIN — LITHIUM CARBONATE 600 MG: 300 TABLET, EXTENDED RELEASE ORAL at 21:04

## 2023-03-05 RX ADMIN — OXCARBAZEPINE 600 MG: 300 TABLET, FILM COATED ORAL at 08:03

## 2023-03-05 RX ADMIN — PROPRANOLOL HYDROCHLORIDE 10 MG: 10 TABLET ORAL at 21:04

## 2023-03-05 RX ADMIN — PROPRANOLOL HYDROCHLORIDE 10 MG: 10 TABLET ORAL at 17:00

## 2023-03-05 RX ADMIN — OXCARBAZEPINE 600 MG: 300 TABLET, FILM COATED ORAL at 17:02

## 2023-03-05 RX ADMIN — LITHIUM CARBONATE 300 MG: 300 TABLET, EXTENDED RELEASE ORAL at 08:03

## 2023-03-05 RX ADMIN — PROPRANOLOL HYDROCHLORIDE 10 MG: 10 TABLET ORAL at 08:03

## 2023-03-05 NOTE — BH NOTES
Affect blunted, mood depressed. Pt did not attend or participate in scheduled group activities. Pt remained isolative and withdrawn to his room this evening. Pt vital signs stable with no complaints of pain. Pt denies SI/HI/AVH. Pt was cooperative and pleasant with staff. Pt was compliant with scheduled medications and did not request a PRN. Pt is in no apparent distress at this time and made no delusional statements. Pt rested in his bed with his eyes closed for 8.75 hours.

## 2023-03-05 NOTE — MASTER TREATMENT PLAN
Master Treatment Plan Review for Rocío Crandall    Date of Admission Date Treatment Plan Reviewed Anticipated Date of Discharge Legal Status    02/29/2023 03/05/2023  Voluntary     Treatment & Discharge Planning Changes    Modality or Intervention Changes 03/03/2023- [place]     Psychotropic Medication Changes 03/03/2023- D/C Abilify 5 mg PO Daily, Start Lithium Carbonate 600 mg PO QHS and Lithium Carbonate 300 mg PO Daily  03/04/2023- Start Inderal 10 mg PO TID   Discharge Planning or Target Date Changes ELOS: 4-7 days, depending on his progress/status. New Issues        Treatment Team Signatures    I have participated in the development of this plan of treatment and agree to its implementation.     Patient Signature       Patient Printed Name Date/Time   /Therapist Signature       /Therapist Printed Name Date/Time   RN Signature       RN Printed Name  Dann Tapia RN Date/Time  03/05/2023  @7313   Unit  Signature       Unit  Printed Name Date/Time   MD Signature       MD Printed Name Date/Time

## 2023-03-05 NOTE — BH NOTES
Openly discusses bipolar condition and signs of depression, silva, and hypomania. Affect flat. Mood anxious. Alert and oriented x 3. Intrusive to staff. Walking fast laps in [place]. Suggesting care for others. Cooperative and polite. Med compliant. \"I have a tendency to want to be with another person. \"  Talks on phone to friends and family often. Played games with peers. Ate all of food, wanting more. Weighed self and had gained 3.5 lbs since admission.   PRN Medication Documentation    Specific patient behavior that led to need for PRN medication: back pain of 3/10  Staff interventions attempted prior to PRN being given: assess, requested Tylenol  PRN medication given: Tylenol 650mg po given at 1234  Patient response/effectiveness of PRN medication: 0/10 at 1330

## 2023-03-05 NOTE — PROGRESS NOTES
Problem: Depressed Mood (Adult/Pediatric)  Goal: *LTG: Understands illness and can identify signs of relapse  Outcome: Progressing Towards Goal  Note: Openly discusses bipolar condition and signs of depression, silva, and hypomania. Affect flat. Mood anxious. Alert and oriented x 3. Intrusive to staff. Walking fast laps in [place]. Suggesting care for others. Cooperative and polite. Med compliant. \"I have a tendency to want to be with another person. \"  Talks on phone to friends and family often. Played games with peers. Ate all of food, wanting more. Weighed self and had gained 3.5 lbs since admission.

## 2023-03-05 NOTE — PROGRESS NOTES
03/04/23 [address] meeting   Attendance Did not attend   Patient did not come out for group or snack , he was resting .

## 2023-03-06 PROCEDURE — 74011250637 HC RX REV CODE- 250/637: Performed by: PSYCHIATRY & NEUROLOGY

## 2023-03-06 PROCEDURE — 74011250637 HC RX REV CODE- 250/637: Performed by: STUDENT IN AN ORGANIZED HEALTH CARE EDUCATION/TRAINING PROGRAM

## 2023-03-06 PROCEDURE — 99232 SBSQ HOSP IP/OBS MODERATE 35: CPT | Performed by: PSYCHIATRY & NEUROLOGY

## 2023-03-06 PROCEDURE — 65220000003 HC RM SEMIPRIVATE PSYCH

## 2023-03-06 RX ADMIN — LITHIUM CARBONATE 300 MG: 300 TABLET, EXTENDED RELEASE ORAL at 08:20

## 2023-03-06 RX ADMIN — ACETAMINOPHEN 650 MG: 325 TABLET, FILM COATED ORAL at 10:21

## 2023-03-06 RX ADMIN — OXCARBAZEPINE 600 MG: 300 TABLET, FILM COATED ORAL at 08:20

## 2023-03-06 RX ADMIN — PROPRANOLOL HYDROCHLORIDE 10 MG: 10 TABLET ORAL at 16:23

## 2023-03-06 RX ADMIN — OXCARBAZEPINE 600 MG: 300 TABLET, FILM COATED ORAL at 18:00

## 2023-03-06 RX ADMIN — LITHIUM CARBONATE 600 MG: 300 TABLET, EXTENDED RELEASE ORAL at 21:07

## 2023-03-06 RX ADMIN — PROPRANOLOL HYDROCHLORIDE 10 MG: 10 TABLET ORAL at 21:07

## 2023-03-06 RX ADMIN — PROPRANOLOL HYDROCHLORIDE 10 MG: 10 TABLET ORAL at 08:20

## 2023-03-06 NOTE — PROGRESS NOTES
Problem: Depressed Mood (Adult/Pediatric)  Goal: *STG: Attends activities and groups  Outcome: Progressing Towards Goal  Goal: *STG: Remains safe in hospital  Outcome: Progressing Towards Goal  Goal: *STG: Complies with medication therapy  Outcome: Progressing Towards Goal     Problem: Patient Education: Go to Patient Education Activity  Goal: Patient/Family Education  Outcome: Progressing Towards Goal     Problem: Depressed Mood (Adult/Pediatric)  Goal: *LTG: Returns to previous level of functioning and participates with after care plan  Outcome: Resolved/Met

## 2023-03-06 NOTE — BH NOTES
Behavioral Health Interdisciplinary Rounds     Patient Name: Destinee Albrecht  Age: 38 y.o. Room/Bed:  230/01  Primary Diagnosis: Severe depressed bipolar I disorder without psychotic features ([facility] [address]   Admission Status: Voluntary     Readmission within 30 days: no  Power of  in place: no  Patient requires a blocked bed: no          Reason for blocked bed:     VTE Prophylaxis: Not indicated    Mobility needs/Fall risk: yes  Flu Vaccine : no   Nutritional Plan: no  Consults: no         Labs/Testing due today?: no    Sleep hours: 6.25       Participation in Care/Groups:  yes  Medication Compliant?: Yes  PRNS (last 24 hours): Pain    Restraints (last 24 hours):  no     CIWA (range last 24 hours):     COWS (range last 24 hours):      Alcohol screening (AUDIT) completed -   AUDIT Score: 0     If applicable, date SBIRT discussed in treatment team AND documented:   AUDIT Screen Score: AUDIT Score: 0      Document Brief Intervention (corresponds directly with the 5 A's, Ask, Advise, Assess, Assist, and Arrange):  n/a    At- Risk Patients (Score 7-15 for women; 8-15 for men)  Discuss concern patient is drinking at unhealthy levels known to increase risk of alcohol-related health problems. Is Patient ready to commit to change? N/a    If No:  Encourage reflection  Discuss short term and long term health risks of consuming alcohol  Barriers to change  Reaffirm willingness to help / Educational materials provided  If Yes:  Set goal  Plan  Educational materials provided    Harmful use or Dependence (Score 16 or greater)  Discuss short term and long term health risks of consuming alcohol  Recommendations  Negotiate drinking goal  Recommend addiction specialist/center  Arrange follow-up appointments.     Tobacco - patient is a smoker: Have You Used Tobacco in the Past 30 Days: Yes (chews tobacco)  Illegal Drugs use: Have You Used Any Illegal Substances Over the Past 12 Months: No    24 hour chart check complete: yes Patient goal(s) for today: to be discharged soon  Treatment team focus/goals: Remains safe in hospital  Progress note patient will be discharged to home tomorrow    LOS:  9  Expected LOS: 1    Financial concerns/prescription coverage:  no  Family contact: no      Family requesting physician contact today:  no  Discharge plan: home with family  Access to weapons : no        Outpatient provider(s): [facility], [facility] [address] [facility]  Patient's preferred phone number for follow up call : [phone]    Participating treatment team members: Dr. Bernarda Grace (assigned SW), Chris Ledbetter

## 2023-03-06 NOTE — PROGRESS NOTES
Patient did not come out for group or snack , he was resting    03/05/23 8209   2000 [facility][1] meeting   Attendance Did not attend

## 2023-03-06 NOTE — BH NOTES
Patient reports feeling much better, he feels he is at his baseline and is ready for discharge. He is excited to start his new job, he denies SI/HI and his insight and judgment are improving. He will be discharged to home tomorrow and [facility] outpatient will be transporting patient home. He will follow up with [address] [place] [address] for therapy until his psychiatrist is in place which will be transferring from [address] [place] [address]. He has been compliant, participating in groups and appropriate with peers and staff. He is doing his ADL care.

## 2023-03-06 NOTE — BH NOTES
Affect blunted, mood euthymic. Alert and oriented x 4. Denies pain. No SI/HI/AVH. No delusional or paranoid statements made. Patient cooperative and pleasant. Medication compliant. Appetite good eats 100% of all meals plus snacks. Patient attended and engaged in groups with prompting. Patient in no apparent distress. Vitals signs within normal limits. Patient will be discharged home tomorrow if continues to be stable. PRN Medication Documentation    Specific patient behavior that led to need for PRN medication: Patient requested PRN Tylenol for a headache. Staff interventions attempted prior to PRN being given: Assessment done  PRN medication given: Tylenol 650 mg po at 1021 for a headache. Patient response/effectiveness of PRN medication: Positive effects.

## 2023-03-06 NOTE — BH NOTES
INTERVAL Hx:  Records and clinical information were reviewed. States he's very depressed again today. Seemed to feel better off and on yesterday after he was so depressed in the AM, but he doesn't seem to remember those moments of improvement. Continued to educate him and provide support and insights re: his BPAD and the unpredictable course it often has. Also discussed Rx plans and the rationale for them and the plan going forward. Denies having any SE's with the increased Trileptal. Will increase the lithium SR to 300/600 today and watch for any worsening SE's. [Li] drawn this AM on 600 mg/day. May need to augment further depending on his progress. Slept 8.5 hours last night. 03/04 - overnight, the patient has been visible, cooperative but still dysphoric, endorsing SI. Patient reports being tearful this morning. Lithium level return, it is low at 0.47. Discussed increasing dose, per chart review attending psychiatrist did last night prior to level being drawn. Pt c/o UE tremor BL which was present prior to initiating lithium, he is agreeable with starting a low dose beta blocker to address any worsening of the tremor with increased lithium dose. BP hsa been low at 100s/60s.     03/05 - no acute overnight events. Patient slept 8.25 hours and got no PRNs for agitation or aggression. Per RN report, he is manic appearing today, doing exercises throughout the morning though he states he is feeling fine and believes he is back to his baseline. Patient medication compliant, tremor has improved since starting BB. Patient states there have been no issues since increasing the Li. He denies SI/HI/AVH/PI and states his grandmother and brother see him often and can weigh in on his mental status once he is discharged.     MEDS:  Current Facility-Administered Medications   Medication Dose Route Frequency    propranoloL (INDERAL) tablet 10 mg  10 mg Oral TID    lithium carbonate SR (LITHOBID) tablet 300 mg  300 mg Oral DAILY lithium carbonate SR (LITHOBID) tablet 600 mg  600 mg Oral QHS    OXcarbazepine (TRILEPTAL) tablet 600 mg  600 mg Oral BID    nicotine (NICODERM CQ) 21 mg/24 hr patch 1 Patch  1 Patch TransDERmal DAILY    LORazepam (ATIVAN) tablet 1 mg  1 mg Oral Q6H PRN    lidocaine 4 % patch 1 Patch  1 Patch TransDERmal DAILY PRN    magnesium hydroxide (MILK OF MAGNESIA) 400 mg/5 mL oral suspension 30 mL  30 mL Oral DAILY PRN    alum-mag hydroxide-simeth (MYLANTA) oral suspension 30 mL  30 mL Oral Q4H PRN    acetaminophen (TYLENOL) tablet 650 mg  650 mg Oral Q4H PRN    traZODone (DESYREL) tablet 50 mg  50 mg Oral QHS PRN    hydrOXYzine pamoate (VISTARIL) capsule 50 mg  50 mg Oral QID PRN    OLANZapine (ZyPREXA) 10 mg in sterile water (preservative free) 2 mL injection  10 mg IntraMUSCular BID PRN       VITALS: Patient Vitals for the past 12 hrs:   Temp Pulse Resp BP SpO2   03/05/23 1909 (!) 96.5 °F (35.8 °C) 75 17 (!) 96/55 98 %         LABS: .No results found for this or any previous visit (from the past 24 hour(s)). MSE:   Appearance: casually dressed; adequately groomed  Behavior: no agitation; more withdrawn  Motor: normal  Mood/Affect:  dysphoric currently; some lability  Thought Process: coherent; organized  Thought Content: no delusions; no SI/HI  Perceptions: no hallucinations  Insight/Judgment: fair    ASSESSMENT:   1. Bipolar disorder, depressed, severe (F31.4)    PLAN:  1.  CONTINUE LiSR 300 mg daily and 600 mg QHS. (Li level 0.47 on 600 mg/d)  2. CONTINUE Propranolol 10 mg TID for essential tremor, titrate and change to LA if tolerating  3. Continue the Trileptal at 600 mg BID. 4.  Continue the PRN Trazodone and Vistaril. 5.  ELOS: 4-7 days, depending on his progress/status.

## 2023-03-06 NOTE — BH NOTES
Affect blunted, mood depressed. Pt did not attend or participate in scheduled group activities. Pt remained isolative and withdrawn to his room. Pt vital signs stable with no complaints or pain. Pt refused snack. Pt denies SI/HI/AVH. Pt was compliant with scheduled medications and did not request a PRN. Pt is in no apparent distress at this time and made no delusional statements. Pt rested in his bed with his eyes closed for 6.25 hours.

## 2023-03-06 NOTE — PROGRESS NOTES
03/06/23 1408   2000 [facility] meeting   Attendance Did not attend   Number of participants 4   Time in 1350   Time out 1406   Total Time 16   MTP problem Depression   Interventions/techniques Informed   Follows directions Followed directions   Interactions Interacted appropriately   Mental Status Calm

## 2023-03-06 NOTE — DISCHARGE SUMMARY
INTERVAL Hx:  Records and clinical information from the weekend were reviewed. States he's been feeling much better, and essentially back to his baseline. Hasn't had any real fluctuations in the past 3 days, and he feels he's ready for D/C soon and to start his new job. No SI at all and he appears to be stable, objectively. Denies having any SE's with the increased Lithium or Trileptal, including no worsened tremors--Propranolol may be helping as well. [Li] was 0.47 when taking 600 mg/day. Slept 6.25 hours last night. MEDS:  Current Facility-Administered Medications   Medication Dose Route Frequency    propranoloL (INDERAL) tablet 10 mg  10 mg Oral TID    lithium carbonate SR (LITHOBID) tablet 300 mg  300 mg Oral DAILY    lithium carbonate SR (LITHOBID) tablet 600 mg  600 mg Oral QHS    OXcarbazepine (TRILEPTAL) tablet 600 mg  600 mg Oral BID    nicotine (NICODERM CQ) 21 mg/24 hr patch 1 Patch  1 Patch TransDERmal DAILY    LORazepam (ATIVAN) tablet 1 mg  1 mg Oral Q6H PRN    lidocaine 4 % patch 1 Patch  1 Patch TransDERmal DAILY PRN    magnesium hydroxide (MILK OF MAGNESIA) 400 mg/5 mL oral suspension 30 mL  30 mL Oral DAILY PRN    alum-mag hydroxide-simeth (MYLANTA) oral suspension 30 mL  30 mL Oral Q4H PRN    acetaminophen (TYLENOL) tablet 650 mg  650 mg Oral Q4H PRN    traZODone (DESYREL) tablet 50 mg  50 mg Oral QHS PRN    hydrOXYzine pamoate (VISTARIL) capsule 50 mg  50 mg Oral QID PRN    OLANZapine (ZyPREXA) 10 mg in sterile water (preservative free) 2 mL injection  10 mg IntraMUSCular BID PRN       VITALS: Patient Vitals for the past 12 hrs:   Temp Pulse Resp BP SpO2   03/06/23 0759 (!) 96.4 °F (35.8 °C) 80 17 (!) 102/57 98 %         LABS: .No results found for this or any previous visit (from the past 24 hour(s)).       MSE:   Appearance: casually dressed; adequately groomed  Behavior: calm and social  Motor: normal  Mood/Affect:  euthymic and stable  Thought Process: coherent; organized  Thought Content: no delusions; no SI/HI  Perceptions: no hallucinations  Insight/Judgment: improving    ASSESSMENT:   1. Bipolar disorder, depressed, severe (F31.4)    PLAN:  1. Continue the LiSR at 300 mg daily and 600 mg QHS. 2.  Continue the Trileptal at 600 mg BID. 3.  Continue the PRN Trazodone and Vistaril. 4.  Plan for D/C tomorrow.

## 2023-03-06 NOTE — GROUP NOTE
NIKHIL  GROUP DOCUMENTATION INDIVIDUAL                                                                          Group Therapy Note    Date: 3/6/2023    Group Start Time: 0900  Group End Time: 9490  Group Topic: Process Group - Inpatient    [address], [address] [facility] GROUP DOCUMENTATION GROUP    Group Therapy Note    Attendees: Focus of session was based on handout from [employer] on keeping track the difference between vitality and suffering. We spoke about how the handout can be helpful  to help identify the painful thoughts, emotions, memories, sensations, and urges that show up on a daily basis and what we do to increase vitality (enriches life, improved health and well-being in some way) or increase suffering (restricted or made life worse, drained health and well-being). We spoke about what group members could identify those triggers and what can be done to increase vitality to help get through those difficult moments and/or days. Attendance: Attended    Patient's Goal:      Understands illness and can identify signs of relapse           Interventions/techniques: Informed, Validated, Promoted peer support, and Supported    Follows Directions: Followed directions    Interactions: Interacted appropriately    Mental Status: Anxious, Calm, Congruent, and Preoccupied    Behavior/appearance: Attentive, Cooperative, and Needed prompting    Goals Achieved: Able to engage in interactions, Able to give feedback to another, Able to receive feedback, Discussed coping, Identified feelings, Identified triggers, and Identified relapse prevention strategies      Additional Notes:  Luiz Vega participated in group with prompting. He was quiet in group and he kept getting up and down and leaving the session. He was able to engage in group discussion and spoke about how he is trying to be more positive in his mind and knows that it is something he needs to be more aware of.       Vi Blanco Hetal

## 2023-03-06 NOTE — BH NOTES
Shift change report given to Karla Holt re: SBAR, medications, and behavior. Chung fall risk score:   Low

## 2023-03-07 VITALS
HEIGHT: 70 IN | RESPIRATION RATE: 17 BRPM | OXYGEN SATURATION: 98 % | SYSTOLIC BLOOD PRESSURE: 108 MMHG | BODY MASS INDEX: 23.91 KG/M2 | WEIGHT: 167 LBS | DIASTOLIC BLOOD PRESSURE: 56 MMHG | HEART RATE: 75 BPM | TEMPERATURE: 97.5 F

## 2023-03-07 PROCEDURE — 74011250637 HC RX REV CODE- 250/637: Performed by: PSYCHIATRY & NEUROLOGY

## 2023-03-07 RX ORDER — LITHIUM CARBONATE 300 MG/1
TABLET, FILM COATED, EXTENDED RELEASE ORAL
Qty: 90 TABLET | Refills: 0 | Status: SHIPPED | OUTPATIENT
Start: 2023-03-07

## 2023-03-07 RX ORDER — PROPRANOLOL HYDROCHLORIDE 10 MG/1
10 TABLET ORAL 3 TIMES DAILY
Qty: 90 TABLET | Refills: 0 | Status: SHIPPED | OUTPATIENT
Start: 2023-03-07

## 2023-03-07 RX ORDER — OXCARBAZEPINE 600 MG/1
600 TABLET, FILM COATED ORAL 2 TIMES DAILY
Qty: 60 TABLET | Refills: 0 | Status: SHIPPED | OUTPATIENT
Start: 2023-03-07

## 2023-03-07 RX ORDER — IBUPROFEN 200 MG
1 TABLET ORAL DAILY
Qty: 30 PATCH | Refills: 0 | Status: SHIPPED | OUTPATIENT
Start: 2023-03-08 | End: 2023-04-07

## 2023-03-07 RX ADMIN — OXCARBAZEPINE 600 MG: 300 TABLET, FILM COATED ORAL at 08:23

## 2023-03-07 RX ADMIN — LITHIUM CARBONATE 300 MG: 300 TABLET, EXTENDED RELEASE ORAL at 08:23

## 2023-03-07 RX ADMIN — PROPRANOLOL HYDROCHLORIDE 10 MG: 10 TABLET ORAL at 08:23

## 2023-03-07 NOTE — DISCHARGE INSTRUCTIONS
BEHAVIORAL HEALTH NURSING DISCHARGE NOTE      The following personal items collected during your admission are returned to you:   Dental Appliance: Dental Appliances: None  Vision: Visual Aid: Glasses, With patient  Hearing Aid:    Jewelry: Jewelry: None  Clothing: Clothing: At bedside, Jacket/Coat, Pants, Shirt, Socks, Undergarments, With patient  Other Valuables: Other Valuables: Cell Phone, Eyeglasses, [facility], Cigarettes (see property sheet)  Valuables sent to safe: Personal Items Sent to Safe:  (cell phone, wallet, keys. see propert sheet)      PATIENT INSTRUCTIONS:    What to do at Home:    You may resume normal activities. Avoid making any critical decisions for at least 24-hours. Recommended diet: Regular Diet. Recommended activity: Activity as tolerated. National Suicide Prevention Line: 2-216.875.2269. [place] Crisis Stabilization 6-481-481-255.839.5960. Smoking Cessation Hotline 5-978-QUIT NOW (977-9431). If you have problems relating to your recovery, call your physician. The discharge information has been reviewed with the patient. The patient verbalized understanding.

## 2023-03-07 NOTE — PROGRESS NOTES
03/06/23 [facility] meeting   Attendance Did not attend   Number of participants 5   Time in 2030   Time out 2100   Total Time 30     He did not want to attend group.

## 2023-03-07 NOTE — BH NOTES
Behavioral Health Transition Record to Provider    Patient Name: Florina Verduzco  YOB: 1980  Medical Record Number: [id number]  Date of Admission: 2/25/2023  Date of Discharge: 3/7/23    Attending Provider: Monica Noble MD  Discharging Provider: Ibrahima Beal  To contact this individual call [phone] and ask the  to page. If unavailable, ask to be transferred to [facility] Provider on call. [facility] Provider will be available on call 24/7 and during holidays. Primary Care Provider: None    Allergies   Allergen Reactions    Tetracycline Unknown (comments)     Felt strange       Reason for Admission: Florina Verduzco is a 38year old male admitted to the [facility] for increased depression and anxiety. He expresses SI with a plan to OD on medications but no intent to do so. He reports that for the past 1-2 weeks he feels like all he does is sleep an worry. He recently moved to [address] from [place] to live with his grandmother after losing his house when his wife left him. He does not believe that he is able to function well enough to maintain a job and take care of himself. He reports a tremor with Lithium and his level is 0.29, making it unlikely he will tolerate a dosage increase. His last manic episode was in September or October 2022. He is feeling helpless and hopeless, does not feel that he has anything to look forward to. He feels that if he could have a goal to work towards he would be able to feel better. He has been struggling with completing his ADLs over the past several months. He denies any history of medication induced silva. He had been receiving services through the [facility] in [place] but is in the process of transitioning to [address] [place] [address]. He has been set up with a therapist there but is following up with his [place] prescriber until he can be set up with one at [facility].     Admission Diagnosis: Bipolar disorder ([facility] [address] [F31.9]    * No surgery found *    Results for orders placed or performed during the hospital encounter of 02/25/23   SARS-COV-2   Result Value Ref Range    Specimen source Nasopharyngeal      SARS-CoV-2 Not detected NOTD     HEMOGLOBIN A1C WITH EAG   Result Value Ref Range    Hemoglobin A1c 5.1 4.0 - 5.6 %    Est. average glucose 100 mg/dL   LITHIUM   Result Value Ref Range    Lithium level 0.47 (L) 0.60 - 1.20 MMOL/L       Immunizations administered during this encounter:   Immunization History   Administered Date(s) Administered    Tdap 04/16/2015       Screening for Metabolic Disorders for Patients on Antipsychotic Medications  (Data obtained from the EMR)    Estimated Body Mass Index  Estimated body mass index is 23.96 kg/m² as calculated from the following:    Height as of this encounter: 5' 10\" (1.778 m). Weight as of this encounter: 75.8 kg (167 lb). Vital Signs/Blood Pressure  Visit Vitals  BP (!) 108/56 (BP 1 Location: Left upper arm, BP Patient Position: Sitting)   Pulse 75   Temp 97.5 °F (36.4 °C)   Resp 17   Ht 5' 10\" (1.778 m)   Wt 75.8 kg (167 lb)   SpO2 98%   BMI 23.96 kg/m²       Blood Glucose/Hemoglobin A1c  Lab Results   Component Value Date/Time    Glucose 98 02/25/2023 09:54 AM       Lab Results   Component Value Date/Time    Hemoglobin A1c 5.1 03/03/2023 06:01 AM        Lipid Panel  Lab Results   Component Value Date/Time    Cholesterol, total 144 11/16/2022 06:00 AM    HDL Cholesterol 47 11/16/2022 06:00 AM    LDL, calculated 78.6 11/16/2022 06:00 AM    Triglyceride 92 11/16/2022 06:00 AM    CHOL/HDL Ratio 3.1 11/16/2022 06:00 AM        Discharge Diagnosis: Bipolar disorder, depressed, severe (F31.4)    Discharge Plan: Discharge plan of care/case management plan validated with provider discharge order.    Discharge Medication List and Instructions:   Current Discharge Medication List        START taking these medications    Details   lithium carbonate SR (LITHOBID) 300 mg CR tablet Take 1 Tablet by mouth daily AND 2 Tablets nightly. Qty: 90 Tablet, Refills: 0  Start date: 3/7/2023      nicotine (NICODERM CQ) 21 mg/24 hr 1 Patch by TransDERmal route daily for 30 days. Qty: 30 Patch, Refills: 0  Start date: 3/8/2023, End date: 4/7/2023      OXcarbazepine (TRILEPTAL) 600 mg tablet Take 1 Tablet by mouth two (2) times a day. Qty: 60 Tablet, Refills: 0  Start date: 3/7/2023      propranoloL (INDERAL) 10 mg tablet Take 1 Tablet by mouth three (3) times daily. Indications: essential tremor  Qty: 90 Tablet, Refills: 0  Start date: 3/7/2023           STOP taking these medications       ARIPiprazole (ABILIFY) 20 mg tablet Comments:   Reason for Stopping:         lithium carbonate 600 mg capsule Comments:   Reason for Stopping:               Unresulted Labs (24h ago, onward)      None          To obtain results of studies pending at discharge, please contact [phone]    Follow-up Information       Follow up With Specialties Details Why Corbin on 3/14/2023 3pm for therapy [address], [address] [place] [address]  [phone]    Dr. Moreno Early  Call will call with appointment, For medication management [facility]            Advanced Directive:   Does the patient have an appointed surrogate decision maker? No  Does the patient have a Medical Advance Directive? No  Does the patient have a Psychiatric Advance Directive? No  If the patient does not have a surrogate or Medical Advance Directive AND Psychiatric Advance Directive, the patient was offered information on these advance directives Yes and Patient will complete at a later time    Patient Instructions: Please continue all medications until otherwise directed by physician. Tobacco Cessation Discharge Plan:   Is the patient a smoker and needs referral for smoking cessation? Yes  Patient referred to the following for smoking cessation with an appointment?  Yes     Patient was offered medication to assist with smoking cessation at discharge? Yes  Was education for smoking cessation added to the discharge instructions? Yes    Alcohol/Substance Abuse Discharge Plan:   Does the patient have a history of substance/alcohol abuse and requires a referral for treatment? No  Patient referred to the following for substance/alcohol abuse treatment with an appointment? Not applicable  Patient was offered medication to assist with alcohol cessation at discharge? Not applicable  Was education for substance/alcohol abuse added to discharge instructions? Not applicable    Patient discharged to Home; discussed with patient/caregiver and provided to the patient/caregiver either in hard copy or electronically.

## 2023-03-07 NOTE — BH NOTES
Affect constricted, mood less depressed/anxious. Alert and Oriented X 4. Cooperative. . Pt requested to go to bed instead of attending group. Pt slightly anxious about upcoming discharge. Interacted with staff when prompted. . Makes needs known. Denied SI/HI/AVH and pain. Medication compliant. Stable with no s/s of distress.   Laid in bed with eyes closed for 7 hours

## 2023-03-07 NOTE — GROUP NOTE
[facility] GROUP DOCUMENTATION INDIVIDUAL                                                                          Group Therapy Note    Date: 3/7/2023    Group Start Time: 0900  Group End Time: 9300  Group Topic: Process Group - Inpatient    [address] [facility] OP    [place], [address] [facility] [address] [facility] GROUP DOCUMENTATION GROUP    Group Therapy Note  Focus of session was on procrastination and the possible reasons behind why we do it. We spoke about fear and lack of confidence and esteem can play a major role in our reasons for doing this and we spoke about how we can also let being stubborn get in our way and that affects our ability to move forward. We spoke about how we can be contributing the reason to not do these things by how we talk to ourselves and that we tell ourselves that we are not strong enough and we are not capable. We addressed effective ways to avoid procrastination and that we need to refute our arguments aggressively as to why we are putting things off. We spoke about addressing specifically our reason for delay and developing fair arguments against delay and work to move forward.         Attendance: {[employer] ATTENDANCE:24285}    Patient's Goal:  ***    Interventions/techniques: {[facility] GROUP DOC INTERVENTIONS/TECHNIQUES:45422}    Follows Directions: {[employer] FOLLOWS DIRECTION:53540}    Interactions: {[employer] INTERACTIONS:03985}    Mental Status: {[facility] GROUP DOC MENTAL STATUS:42339}    Behavior/appearance: {[facility] GROUP DOC BEHAVIOR/APPEARANCE:86625}    Goals Achieved: {[facility] GROUP DOC GOALS ACHIEVED:89236}      Additional Notes:  ***    Supriya Camejo

## 2023-03-07 NOTE — PROGRESS NOTES
03/07/23 1112   [facility] meeting   Attendance Attended   Number of participants 4   Time in 1030   Time out 1100   Total Time 30   MTP problem Other (comment)  (Severe depressed bipolar I disorder without psychotic features)   Interventions/techniques Supported   Follows directions Followed directions   Interactions Interacted appropriately   Mental Status Anxious;Preoccupied   Behavior/appearance Cooperative   Suicide Risk Factors Bacilio Quiroga has no thoughts of hurting himself nor anyone else   Suicide Protective Factors Denies intent   Goals Achieved Able to engage in interactions; Able to listen to others; Able to give feedback to another     Bacilio Quiroga was out for the Group meeting today. He stated that he is having a good day, ate good, and slept good last night. He is having anxiety at the level of a 2 and no depression. Something good today is that he is going home and nothing bad has happened yet. He is having no thoughts of hurting himself nor anyone else and is in no pain.     Mike Palacios CNA

## 2023-03-07 NOTE — BH NOTES
Discharge Note:    Patient discharged home today. Alert and oriented x 4. Denies pain. No SI/HI/AVH. No delusional or paranoid statements made. Patient cooperative and pleasant. Medication compliant. Appetite good eats 100% of all meals plus snacks. Patient attended and engaged in group with prompting. Patient in no apparent distress. Vitals signs within normal limits. RN reviewed discharge instructions and orders, which included medications, drug name, purpose, dosage, administration time , route and adverse effects. Patient verbalized understanding and written copies provided of discharge orders and instructions. Prescriptions sent into [facility] in [place], [place]. Also provided with suicidal hotline and crisis stabilization line. Patient left the unit at  and accompanied by staff. All personal belongings and valuables sent with patient.

## 2023-03-07 NOTE — BH NOTES
Patient will be discharged to home today and he will be transported by [facility] outpatient. He will follow up with his therapist Uday Mosquera at [address] [facility] on March 14 at 3pm. He will see his psychiatrist Dr. Aide Valadez with [facility] via telehealth. This writer will call patient back once obtaining his follow up appointment for med management with [facility]ARMAR[facility]IN [facility]. [address] [facility] transition of care was routed to [facility] and [address] [place] [address] and he was given a copy as well. Patient was involved and agreeable in his discharge plan.

## 2024-03-11 ENCOUNTER — OFFICE VISIT (OUTPATIENT)
Age: 44
End: 2024-03-11
Payer: MEDICARE

## 2024-03-11 VITALS
OXYGEN SATURATION: 97 % | DIASTOLIC BLOOD PRESSURE: 69 MMHG | HEART RATE: 80 BPM | WEIGHT: 212 LBS | SYSTOLIC BLOOD PRESSURE: 108 MMHG | BODY MASS INDEX: 30.35 KG/M2 | TEMPERATURE: 98 F | HEIGHT: 70 IN

## 2024-03-11 DIAGNOSIS — E66.9 OBESITY, CLASS I, BMI 30-34.9: ICD-10-CM

## 2024-03-11 DIAGNOSIS — G47.33 OBSTRUCTIVE SLEEP APNEA (ADULT) (PEDIATRIC): Primary | ICD-10-CM

## 2024-03-11 PROCEDURE — G8484 FLU IMMUNIZE NO ADMIN: HCPCS | Performed by: INTERNAL MEDICINE

## 2024-03-11 PROCEDURE — 99204 OFFICE O/P NEW MOD 45 MIN: CPT | Performed by: INTERNAL MEDICINE

## 2024-03-11 PROCEDURE — 4004F PT TOBACCO SCREEN RCVD TLK: CPT | Performed by: INTERNAL MEDICINE

## 2024-03-11 PROCEDURE — G8427 DOCREV CUR MEDS BY ELIG CLIN: HCPCS | Performed by: INTERNAL MEDICINE

## 2024-03-11 PROCEDURE — G8419 CALC BMI OUT NRM PARAM NOF/U: HCPCS | Performed by: INTERNAL MEDICINE

## 2024-03-11 RX ORDER — PROPRANOLOL HYDROCHLORIDE 10 MG/1
10 TABLET ORAL 2 TIMES DAILY
COMMUNITY

## 2024-03-11 RX ORDER — ARIPIPRAZOLE 20 MG/1
20 TABLET ORAL DAILY
COMMUNITY
Start: 2024-02-26

## 2024-03-11 RX ORDER — LAMOTRIGINE 200 MG/1
200 TABLET ORAL DAILY
COMMUNITY
Start: 2024-02-14

## 2024-03-11 RX ORDER — LITHIUM CARBONATE 300 MG
TABLET ORAL
COMMUNITY

## 2024-03-11 ASSESSMENT — SLEEP AND FATIGUE QUESTIONNAIRES
HOW LIKELY ARE YOU TO NOD OFF OR FALL ASLEEP IN A CAR, WHILE STOPPED FOR A FEW MINUTES IN TRAFFIC: 0
HOW LIKELY ARE YOU TO NOD OFF OR FALL ASLEEP WHILE WATCHING TV: 0
HOW LIKELY ARE YOU TO NOD OFF OR FALL ASLEEP WHILE SITTING QUIETLY AFTER LUNCH WITHOUT ALCOHOL: 0
NECK CIRCUMFERENCE (INCHES): 15
HOW LIKELY ARE YOU TO NOD OFF OR FALL ASLEEP WHILE SITTING AND TALKING TO SOMEONE: 0
HOW LIKELY ARE YOU TO NOD OFF OR FALL ASLEEP WHILE LYING DOWN TO REST IN THE AFTERNOON WHEN CIRCUMSTANCES PERMIT: 2
ESS TOTAL SCORE: 2
HOW LIKELY ARE YOU TO NOD OFF OR FALL ASLEEP WHILE SITTING INACTIVE IN A PUBLIC PLACE: 0
HOW LIKELY ARE YOU TO NOD OFF OR FALL ASLEEP WHEN YOU ARE A PASSENGER IN A CAR FOR AN HOUR WITHOUT A BREAK: 0
HOW LIKELY ARE YOU TO NOD OFF OR FALL ASLEEP WHILE SITTING AND READING: 0

## 2024-03-11 NOTE — PATIENT INSTRUCTIONS
[address]  [place] [address]  Tel.  [phone]  Fax. [phone] [address]  [place] [address]  Tel.  [phone]  Fax. [phone] [address] [place] [address]  Tel.  [phone]  Fax. [phone]     Sleep Apnea: After Your Visit  Your Care Instructions  Sleep apnea occurs when you frequently stop breathing for 10 seconds or longer during sleep. It can be mild to severe, based on the number of times per hour that you stop breathing or have slowed breathing. Blocked or narrowed airways in your nose, mouth, or throat can cause sleep apnea. Your airway can become blocked when your throat muscles and tongue relax during sleep.  Sleep apnea is common, occurring in 1 out of 20 individuals.  Individuals having any of the following characteristics should be evaluated and treated right away due to high risk and detrimental consequences from untreated sleep apnea:  Obesity  Congestive Heart failure  Atrial Fibrillation  Uncontrolled Hypertension  Type II Diabetes  Night-time Arrhythmias  Stroke  Pulmonary Hypertension  High-risk Driving Populations (pilots, truck drivers, etc.)  Patients Considering Weight-loss Surgery    How do you know you have sleep apnea?  You probably have sleep apnea if you answer 'yes' to 3 or more of the following questions:  S - Have you been told that you Snore?   T - Are you often Tired during the day?  O - Has anyone Observed you stop breathing while sleeping?  P- Do you have (or are being treated for) high blood Pressure?    B - Are you obese (Body Mass Index > 35)?  A - Is your Age 50 years old or older?  N - Is your Neck size greater than 16 inches?  G - Are you male Gender?  A sleep physician can prescribe a breathing device that prevents tissues in the throat from blocking your airway. Or your doctor may recommend using a dental device (oral breathing device) to help keep your airway open. In some cases, surgery may

## 2024-03-11 NOTE — PROGRESS NOTES
[address]  [place] [address]  Tel.  [phone]  Fax. [phone] [address]  [place] [address]  Tel.  [phone]  Fax. [phone] [address] [place] [address]  Tel.  [phone]  Fax. [phone]         Subjective:      Jakob Blair is an 43 y.o. male self- referred for evaluation for a sleep disorder. He complains of snoring, periods of not breathing associated with excessive daytime sleepiness.  Symptoms began 6 years ago, gradually worsening since that time.he was diagnosed with sleep apnea over 7 years ago.  he was started on CPAP and used it regularly until about 2022 (he had an episode of bipolar depression and came off PAP at that time.      He usually can fall asleep in 15 minutes.  Family or house members note snoring, choking, periods of not breathing. He denies falling asleep while driving.  Jakob Blair does wake up frequently at night. He is bothered by waking up too early and left unable to get back to sleep. He actually sleeps about 6 hours at night and wakes up about 7 times during the night. He does not work shifts:  .   Jakob indicates he does get too little sleep at night. His bedtime is 1000. He awakens at  . He does take naps. He takes 4 naps a week lasting 3. He has the following observed behaviors: Light snoring, Loud snoring, Pauses in breathing, Sleep talking, Becoming very rigid and/or shaking (waking with  gasp or snort);  .  Other remarks:  he works at [employer]  Wakes himself gasping      3/11/2024    11:36 AM   Sleep Medicine   Sitting and reading 0   Watching TV 0   Sitting, inactive in a public place (e.g. a theatre or a meeting) 0   As a passenger in a car for an hour without a break 0   Lying down to rest in the afternoon when circumstances permit 2   Sitting and talking to someone 0   Sitting quietly after a lunch without alcohol 0   In a car, while stopped for a few minutes in traffic 0

## 2024-09-16 ENCOUNTER — HOSPITAL ENCOUNTER (EMERGENCY)
Facility: HOSPITAL | Age: 44
Discharge: HOME | End: 2024-09-16
Payer: MEDICARE

## 2024-09-16 VITALS
TEMPERATURE: 97.5 F | DIASTOLIC BLOOD PRESSURE: 88 MMHG | RESPIRATION RATE: 18 BRPM | BODY MASS INDEX: 30.65 KG/M2 | OXYGEN SATURATION: 100 % | WEIGHT: 214.07 LBS | HEIGHT: 70 IN | SYSTOLIC BLOOD PRESSURE: 135 MMHG | HEART RATE: 73 BPM

## 2024-09-16 DIAGNOSIS — G25.0 ESSENTIAL TREMOR: Primary | ICD-10-CM

## 2024-09-16 LAB
ALBUMIN SERPL-MCNC: 3.8 G/DL (ref 3.5–5)
ALBUMIN/GLOB SERPL: 1.3 (ref 1.1–2.2)
ALP SERPL-CCNC: 88 U/L (ref 45–117)
ALT SERPL-CCNC: 23 U/L (ref 12–78)
ANION GAP SERPL CALC-SCNC: 4 MMOL/L (ref 2–12)
AST SERPL-CCNC: 10 U/L (ref 15–37)
BASOPHILS # BLD: 0.1 K/UL (ref 0–0.1)
BASOPHILS NFR BLD: 1 % (ref 0–1)
BILIRUB SERPL-MCNC: 0.6 MG/DL (ref 0.2–1)
BUN SERPL-MCNC: 18 MG/DL (ref 6–20)
BUN/CREAT SERPL: 15 (ref 12–20)
CALCIUM SERPL-MCNC: 9.3 MG/DL (ref 8.5–10.1)
CHLORIDE SERPL-SCNC: 106 MMOL/L (ref 97–108)
CO2 SERPL-SCNC: 30 MMOL/L (ref 21–32)
CREAT SERPL-MCNC: 1.22 MG/DL (ref 0.7–1.3)
DIFFERENTIAL METHOD BLD: ABNORMAL
EOSINOPHIL # BLD: 0.4 K/UL (ref 0–0.4)
EOSINOPHIL NFR BLD: 4 % (ref 0–7)
ERYTHROCYTE [DISTWIDTH] IN BLOOD BY AUTOMATED COUNT: 12.3 % (ref 11.5–14.5)
GLOBULIN SER CALC-MCNC: 3 G/DL (ref 2–4)
GLUCOSE SERPL-MCNC: 103 MG/DL (ref 65–100)
HCT VFR BLD AUTO: 41.8 % (ref 36.6–50.3)
HGB BLD-MCNC: 13.9 G/DL (ref 12.1–17)
IMM GRANULOCYTES # BLD AUTO: 0.1 K/UL (ref 0–0.04)
IMM GRANULOCYTES NFR BLD AUTO: 1 % (ref 0–0.5)
LYMPHOCYTES # BLD: 2 K/UL (ref 0.8–3.5)
LYMPHOCYTES NFR BLD: 17 % (ref 12–49)
MCH RBC QN AUTO: 32 PG (ref 26–34)
MCHC RBC AUTO-ENTMCNC: 33.3 G/DL (ref 30–36.5)
MCV RBC AUTO: 96.1 FL (ref 80–99)
MONOCYTES # BLD: 0.9 K/UL (ref 0–1)
MONOCYTES NFR BLD: 8 % (ref 5–13)
NEUTS SEG # BLD: 8.5 K/UL (ref 1.8–8)
NEUTS SEG NFR BLD: 69 % (ref 32–75)
NRBC # BLD: 0 K/UL (ref 0–0.01)
NRBC BLD-RTO: 0 PER 100 WBC
PLATELET # BLD AUTO: 219 K/UL (ref 150–400)
PMV BLD AUTO: 10.8 FL (ref 8.9–12.9)
POTASSIUM SERPL-SCNC: 4 MMOL/L (ref 3.5–5.1)
PROT SERPL-MCNC: 6.8 G/DL (ref 6.4–8.2)
RBC # BLD AUTO: 4.35 M/UL (ref 4.1–5.7)
SODIUM SERPL-SCNC: 140 MMOL/L (ref 136–145)
WBC # BLD AUTO: 12 K/UL (ref 4.1–11.1)

## 2024-09-16 PROCEDURE — 99283 EMERGENCY DEPT VISIT LOW MDM: CPT

## 2024-09-16 PROCEDURE — 80053 COMPREHEN METABOLIC PANEL: CPT

## 2024-09-16 PROCEDURE — 36415 COLL VENOUS BLD VENIPUNCTURE: CPT

## 2024-09-16 PROCEDURE — 85025 COMPLETE CBC W/AUTO DIFF WBC: CPT

## 2024-09-16 ASSESSMENT — PAIN SCALES - GENERAL: PAINLEVEL_OUTOF10: 0
